# Patient Record
Sex: MALE | Race: WHITE | NOT HISPANIC OR LATINO | Employment: OTHER | URBAN - METROPOLITAN AREA
[De-identification: names, ages, dates, MRNs, and addresses within clinical notes are randomized per-mention and may not be internally consistent; named-entity substitution may affect disease eponyms.]

---

## 2018-04-09 ENCOUNTER — AMBULATORY - HEALTHEAST (OUTPATIENT)
Dept: UROLOGY | Facility: CLINIC | Age: 78
End: 2018-04-09

## 2018-04-09 ENCOUNTER — AMBULATORY - HEALTHEAST (OUTPATIENT)
Dept: LAB | Facility: HOSPITAL | Age: 78
End: 2018-04-09

## 2018-04-09 DIAGNOSIS — N20.1 CALCULUS OF URETER: ICD-10-CM

## 2018-04-10 LAB
1ST CONSTITUENT:: NORMAL
SOURCE: NORMAL

## 2018-04-19 ENCOUNTER — OFFICE VISIT - HEALTHEAST (OUTPATIENT)
Dept: UROLOGY | Facility: CLINIC | Age: 78
End: 2018-04-19

## 2018-04-19 DIAGNOSIS — N20.9 URINARY TRACT STONES: ICD-10-CM

## 2018-04-19 DIAGNOSIS — N21.0 CALCULUS OF BLADDER: ICD-10-CM

## 2018-04-19 DIAGNOSIS — N20.9 URINARY CALCULUS: ICD-10-CM

## 2018-10-01 ENCOUNTER — TRANSFERRED RECORDS (OUTPATIENT)
Dept: HEALTH INFORMATION MANAGEMENT | Facility: CLINIC | Age: 78
End: 2018-10-01

## 2018-10-03 ENCOUNTER — TELEPHONE (OUTPATIENT)
Dept: NUCLEAR MEDICINE | Facility: CLINIC | Age: 78
End: 2018-10-03

## 2018-10-03 NOTE — TELEPHONE ENCOUNTER
I spoke with Fabrice at [facility]  This patient's plan follows Medicare Guidelines if Medicare covers then [facility] covers the rest at 100% REF#[id number]    Upon checking on [employer] Medicare compliance  CPT 27217 with Med code   ICD-10-Parkinson disease (H) [G20  No policy exists-

## 2018-10-24 ENCOUNTER — RADIANT APPOINTMENT (OUTPATIENT)
Dept: NUCLEAR MEDICINE | Facility: CLINIC | Age: 78
End: 2018-10-24
Attending: PSYCHIATRY & NEUROLOGY
Payer: COMMERCIAL

## 2018-10-24 DIAGNOSIS — G20.A1 PARKINSON DISEASE (H): ICD-10-CM

## 2018-10-24 PROCEDURE — A9584 IODINE I-123 IOFLUPANE: HCPCS | Performed by: RADIOLOGY

## 2018-10-24 PROCEDURE — 78607 NM BRAIN IMAGING TOMOGRAPHIC (SPECT) DATSCAN: CPT | Performed by: RADIOLOGY

## 2019-10-03 ENCOUNTER — RECORDS - HEALTHEAST (OUTPATIENT)
Dept: ADMINISTRATIVE | Facility: OTHER | Age: 79
End: 2019-10-03

## 2019-10-11 ENCOUNTER — HOSPITAL ENCOUNTER (OUTPATIENT)
Dept: MRI IMAGING | Facility: HOSPITAL | Age: 79
Discharge: HOME | End: 2019-10-11
Attending: PSYCHIATRY & NEUROLOGY

## 2019-10-11 DIAGNOSIS — G20.A1 PARKINSON DISEASE (H): ICD-10-CM

## 2019-10-11 DIAGNOSIS — M72.2 PLANTAR FASCIITIS, RIGHT: ICD-10-CM

## 2020-03-12 ENCOUNTER — TRANSCRIBE ORDERS (OUTPATIENT)
Dept: OTHER | Age: 80
End: 2020-03-12

## 2020-03-12 DIAGNOSIS — G20.A1 PARKINSON DISEASE (H): Primary | ICD-10-CM

## 2020-03-24 ENCOUNTER — TELEPHONE (OUTPATIENT)
Dept: NEUROLOGY | Facility: CLINIC | Age: 80
End: 2020-03-24

## 2020-03-24 NOTE — TELEPHONE ENCOUNTER
Spoke to patient's wife Elham. Told her that we have to reschedule patient's new DEEP BRAIN STIMULATION visit with Dr. Washington and put it 4-6 weeks out. I told her that we are going day by day to see what we will need to do to keep our patients safe. She was understanding and will expect a call from a .

## 2020-04-22 NOTE — TELEPHONE ENCOUNTER
RECORDS RECEIVED FROM: Care Everywhere   Date of Appt: 4.23.20   NOTES (FOR ALL VISITS) STATUS DETAILS   OFFICE NOTE from referring provider  CE 3.12.20- Referral only from Dr. Bud Ste[facility]    10.1.18- OV- Dr. Bud Peres[facility]   OFFICE NOTE from other specialist CE 5.24.19- OV- Dr. Tammy Coe   DISCHARGE SUMMARY from hospital N/A    DISCHARGE REPORT from the ER N/A    OPERATIVE REPORT N/A    MEDICATION LIST CE    IMAGING  (FOR ALL VISITS)     EMG N/A    EEG N/A    MRI (HEAD, NECK, SPINE) N/A    LUMBAR PUNCTURE N/A    DONNY Scan Pacs 10.24.18- [facility] [place]   CT (HEAD, NECK, SPINE) N/A

## 2020-04-23 ENCOUNTER — PRE VISIT (OUTPATIENT)
Dept: NEUROLOGY | Facility: CLINIC | Age: 80
End: 2020-04-23

## 2020-04-23 ENCOUNTER — VIRTUAL VISIT (OUTPATIENT)
Dept: NEUROLOGY | Facility: CLINIC | Age: 80
End: 2020-04-23
Attending: PSYCHIATRY & NEUROLOGY
Payer: COMMERCIAL

## 2020-04-23 DIAGNOSIS — G20.A1 PARKINSON'S DISEASE (H): Primary | ICD-10-CM

## 2020-04-23 PROBLEM — C44.90 MALIGNANT NEOPLASM OF SKIN: Status: ACTIVE | Noted: 2017-08-03

## 2020-04-23 PROBLEM — E78.00 PURE HYPERCHOLESTEROLEMIA: Status: ACTIVE | Noted: 2020-04-23

## 2020-04-23 RX ORDER — CARBIDOPA AND LEVODOPA 50; 200 MG/1; MG/1
1 TABLET, EXTENDED RELEASE ORAL 4 TIMES DAILY
COMMUNITY
Start: 2018-11-28

## 2020-04-23 RX ORDER — LISINOPRIL 20 MG/1
20 TABLET ORAL EVERY MORNING
COMMUNITY
Start: 2020-04-13

## 2020-04-23 RX ORDER — SIMVASTATIN 40 MG
40 TABLET ORAL AT BEDTIME
COMMUNITY
Start: 2020-04-13

## 2020-04-23 RX ORDER — TRAZODONE HYDROCHLORIDE 50 MG/1
50 TABLET, FILM COATED ORAL
COMMUNITY
Start: 2020-03-11 | End: 2024-04-11

## 2020-04-23 ASSESSMENT — UNIFIED PARKINSONS DISEASE RATING SCALE (UPDRS)
HANDMOVEMENTS_RIGHT: MILD: ANY OF THE FOLLOWING: A) 3 TO 5 INTERRUPTIONS DURING TAPPING B) MILD SLOWING C) THE AMPLITUDE DECREMENTS MIDWAY IN THE 10-MOVEMENT SEQUENCE
ARISING_CHAIR: SLIGHT: ARISING IS SLOWER THAN NORMAL, OR MAY NEED MORE THAN ONE ATTEMPT, OR MAY NEED TO MOVE FORWARD IN THE CHAIR TO ARISE.  NO NEED TO USE THE ARMS OF THE CHAIR.
FINGER_TAPPING_RIGHT: MILD: ANY OF THE FOLLOWING: A) 3 TO 5 INTERRUPTIONS DURING TAPPING B) MILD SLOWING C) THE AMPLITUDE DECREMENTS MIDWAY IN THE 10-MOVEMENT SEQUENCE
FINGER_TAPPING_LEFT: SLIGHT: ANY OF THE FOLLOWING: A) THE REGULAR RHYTHM IS BROKEN WITH ONE WITH ONE OR TWO INTERRUPTIONS OR HESITATIONS OF THE MOVEMENT B) SLIGHT SLOWING C) THE AMPLITUDE DECREMENTS NEAR THE END OF THE 10 MOVEMENTS.
AMPLITUDE_LIP_JAW: NORMAL: NO TREMOR.
SPONTANEITY_OF_MOVEMENT: 0: NORMAL.  NO PROBLEMS.
PRONATION_SUPINATION_LEFT: SLIGHT: ANY OF THE FOLLOWING: A) THE REGULAR RHYTHM IS BROKEN WITH ONE WITH ONE OR TWO INTERRUPTIONS OR HESITATIONS OF THE MOVEMENT B) SLIGHT SLOWING C) THE AMPLITUDE DECREMENTS NEAR THE END OF THE 10 MOVEMENTS.
TOETAPPING_RIGHT: SLIGHT: ANY OF THE FOLLOWING: A) THE REGULAR RHYTHM IS BROKEN WITH ONE WITH ONE OR TWO INTERRUPTIONS OR HESITATIONS OF THE MOVEMENT B) SLIGHT SLOWING C) THE AMPLITUDE DECREMENTS NEAR THE END OF THE 10 MOVEMENTS.
LEG_AGILITY_LEFT: SLIGHT: ANY OF THE FOLLOWING: A) THE REGULAR RHYTHM IS BROKEN WITH ONE WITH ONE OR TWO INTERRUPTIONS OR HESITATIONS OF THE MOVEMENT B) SLIGHT SLOWING C) THE AMPLITUDE DECREMENTS NEAR THE END OF THE 10 MOVEMENTS.
PRONATION_SUPINATION_RIGHT: SLIGHT: ANY OF THE FOLLOWING: A) THE REGULAR RHYTHM IS BROKEN WITH ONE WITH ONE OR TWO INTERRUPTIONS OR HESITATIONS OF THE MOVEMENT B) SLIGHT SLOWING C) THE AMPLITUDE DECREMENTS NEAR THE END OF THE 10 MOVEMENTS.
AMPLITUDE_RUE: MILD > 1 CM BUT < 3 CM IN MAXIMAL AMPLITUDE.
FREEZING_GAIT: NORMAL
PARKINSONS_MEDS: ON
POSTURE: 1 SLIGHT.  NOT QUITE ERECT BUT COULD BE NORMAL FOR OLDER PERSONS.
TOETAPPING_LEFT: SLIGHT: ANY OF THE FOLLOWING: A) THE REGULAR RHYTHM IS BROKEN WITH ONE WITH ONE OR TWO INTERRUPTIONS OR HESITATIONS OF THE MOVEMENT B) SLIGHT SLOWING C) THE AMPLITUDE DECREMENTS NEAR THE END OF THE 10 MOVEMENTS.
AMPLITUDE_RLE: SLIGHT: < 1 CM IN MAXIMAL AMPLITUDE.
LEG_AGILITY_RIGHT: MILD: ANY OF THE FOLLOWING: A) 3 TO 5 INTERRUPTIONS DURING TAPPING B) MILD SLOWING C) THE AMPLITUDE DECREMENTS MIDWAY IN THE 10-MOVEMENT SEQUENCE
AMPLITUDE_LLE: NORMAL: NO TREMOR.
HANDMOVEMENTS_LEFT: SLIGHT: ANY OF THE FOLLOWING: A) THE REGULAR RHYTHM IS BROKEN WITH ONE WITH ONE OR TWO INTERRUPTIONS OR HESITATIONS OF THE MOVEMENT B) SLIGHT SLOWING C) THE AMPLITUDE DECREMENTS NEAR THE END OF THE 10 MOVEMENTS.
GAIT: NORMAL
AMPLITUDE_LUE: NORMAL: NO TREMOR.

## 2020-04-23 NOTE — PATIENT INSTRUCTIONS
Today you spoke to Horacio Hairston, and JEAN MARIE Barahona NP. We discuss Deep Brain Stimulation (DBS) as a treatment option for your Parkinson disease symptoms. We understand that you are mainly interested at this point in gathering more information about DBS.  We discussed the process of evaluation for Deep Brain Stimulation at the [facility], which will consist of a MRI of the brain, neuropsychometric evaluation, video On/Off testing and a meeting with one of our DBS neurosurgeons (Dr. Jason Jimenez or Dr. Ankit Bowers). We also discussed the option for attending the [facility] DBS education class. However, due to COVID-19, classes are currently suspended. Once the class opens back up, Mrs. Eveline Mejia RN, (our DBS nurse) will be contacting you to arrange the DBS class.     Plan:  - Arrange to take the DBS class at the [facility], when it becomes available.   - Consider going through the DBS workup process. There is no rush. If you do decide to start the DBS workup, please contact the clinic and Mrs. Eveline Mejia RN, will help with scheduling the workup.   - [employer] has a series of short videos to help Parkinson disease patients understand DBS Therapy. The videos combine engaging patient stories, animations, and education from clinicians.  The website address is: [email or web address]. This is not an endorsement for [employer].   - Please call the clinic for questions or concerns.     Follow up appointment in 4-5 months.

## 2020-04-23 NOTE — PROGRESS NOTES
MOVEMENT DISORDERS TELEMEDICINE VISIT:     PATIENT: Bernard Sanz    : 1940    DATE: 2020    Dear Colleague,     Thank you for referring your patient, Mr. Sanz, to the [facility] NEUROLOGY at [facility]. Please see a copy of my visit note below.     Referring Physician: Dr. Bud Steward [facility]    REASON FOR VISIT: New patient with Parkinson disease, interested in learning more about Deep Brain Stimulation (DBS).    After a review of the patient s situation, this visit was changed from an in-person visit to a Telemedicine visit to reduce the risk of COVID-19 exposure. The patient is being evaluated via a billable Telemedicine visit.    Mr. 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    History obtained from patient and accompanied by his wife. Initial symptoms began in right hand with tremors that he noticed both at rest and with action in  (75 years old). He was diagnosed by Dr. Steward in 2016. 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 Patient was initially treated with carbidopa/levodopa 25/100 mg 3 times a day with good initial treatment.  He was eventually switched to carbidopa/levodopa 50/200 mg 4 times a day due to residual tremors. Patient reports that the tremors in his right hand are worse and tremors have begun in his right leg.  Tremors in his hands make it difficult for him to pour liquid, drink coffee.  Patient reports that levodopa resolves his tremors about 90% at its peak but despite this he  "continues to have residual tremor on and off throughout the day. He shares that stress worsens his tremors. Levodopa takes about 15 minutes to start working and begins to wear off after about 5 hours. He denies side effects to levodopa including hallucinations, dyskinesias, nausea, vomiting.  The patient reports balance issues every once in a while, however, continues to golf with minimal impediment.  He denies falls and freezing of gait.  He denies rigidity and states that bradykinesia is not an issue for him.  He does report more trouble rolling in bed and getting up from a deep seated chair.  He reports his sense of smell is \"good\".  Patient takes trazodone 50 mg at bedtime to help with sleep.  He states that he has a hard time staying asleep.  He gets up once a night to urinate.      Parkinson's Disease Non-motor Symptom Review:  Psychiatric disturbances - He denies issues with mood or changes in behavior.  Patient describes one episode of depression after having a stroke in 1991.  He reports that after the stroke he was unable to work or perform usual activities.  He was never treated medically for depression.    Cognitive impairment -  No issues.   Sleep disturbances - Denies active dreaming.  Hallucinations - none   Speech - He reports slurred speech when he is tired.    Swallowing - No issues.  Salivation - No issues.    Patient suffered a stroke in 1991 and has residual paresthesias in left hand and left perioral area.  In 2013 patient was found to have kidney stones and underwent a procedure for prostate cancer.  Patient reports that during this timeframe, he was inactive and sedentary and developed a left leg DVT which progressed to a pulmonary emboli.  He was treated with Coumadin x6 months.    I have reviewed and updated the patient's Past Medical History, Social History, Family History and Medication List.    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    DJD tarsometatarsal joint right foot     DVT (deep venous thrombosis) (H) 10/2013    left leg with progression to pulmonary emboli     Erectile dysfunction      Hearing loss of both ears      HTN (hypertension)      Hypercholesteremia      Kidney stones 10/16/2013     Parkinson disease (H) 10/26/2015     Plantar fasciitis of right foot 10/2019     Prostate cancer (H) 10/16/2013     Pulmonary emboli (H) 2013     Past Surgical History:   Procedure Laterality Date     Musa's esophagus s/p ablation       CHOLECYSTECTOMY       Social History     Socioeconomic History     Marital status:      Spouse name: Not on file     Number of children: Not on file     Years of education: Not on file     Highest education level: Not on file   Occupational History     Occupation: retired   Social Needs     Financial resource strain: Not on file     Food insecurity     Worry: Not on file     Inability: Not on file     Transportation needs     Medical: Not on file     Non-medical: Not on file   Tobacco Use     Smoking status: Not on file   Substance and Sexual Activity     Alcohol use: Not on file     Drug use: Not on file     Sexual activity: Not on file   Lifestyle     Physical activity     Days per week: Not on file     Minutes per session: Not on file     Stress: Not on file   Relationships     Social connections     Talks on phone: Not on file     Gets together: Not on file     Attends [employer] service: Not on file     Active member of club or organization: Not on file     Attends meetings of clubs or organizations: Not on file     Relationship status: Not on file     Intimate partner violence     Fear of current or ex partner: Not on file     Emotionally abused: Not on file     Physically abused: Not on file     Forced sexual activity: Not on file   Other Topics Concern     Not on file   Social  History Narrative    Lives with wife in a home.   54 years.       Medications:  Current Outpatient Medications   Medication Sig Dispense Refill     carbidopa-levodopa (SINEMET CR)  MG CR tablet        lisinopril (ZESTRIL) 20 MG tablet Take 20 mg by mouth       omeprazole (PRILOSEC) 20 MG DR capsule TAKE 1 CAPSULE TWICE DAILY BEFORE MEALS       simvastatin (ZOCOR) 40 MG tablet Take 40 mg by mouth       traZODone (DESYREL) 50 MG tablet Take 50 mg by mouth          Movement Disorder Medications                   5 am 10 am 3 pm 8 pm                 CD/LD 50/200 mg 1 1 1 1   Last taken at 6 am    Allergies:  Tetracycline    Physical Exam:  GENERAL: alert, active, attentive, appropriately groomed   HEENT: normocephalic, eyes open with no discharge, nares patent  CHEST: non labored breathing   PSYCH: mood stable, jovial     Neurologic Exam:  MENTAL STATUS: Alert and oriented to person, place, time, and situation. Follows commands. Recent and remote memory intact. Attention span and concentration intact. Fund of knowledge intact to current events.   SPEECH: Fluent, intact comprehension and articulation  CN: visual fields intact, EOMIB,  no nystagmus, normal saccades, facial movement symmetric, some hearing loss though grossly intact to conversation, tongue protrudes midline   MOTOR: Moves all extremities equally against gravity without difficulty  Involuntary movements: refer to UDPRS III  COORDINATION: no dysmetria with FTN  GAIT: able to rise unassisted from a seated position, normal arm swing and normal stride length, no en bloc turns, no ataxia     UPDRS III  UPDRS Values 4/23/2020   Time: 8:33 AM   Medication On   Finger Taps R 2   Finger Taps L 1   Hand Mvt R 2   Hand Mvt L 1   Pron-/Supinate R 1   Pron-/Supinate L 1   Toe Tap R 1   Toe Tap L 1   Leg Agility R 2   Leg Agility L 1   Arise From Chair 1   Gait 0   Gait Freezing 0   Posture 1   Global Spont Mvt 0   Postural Tremor RUE 2   Postural Tremor LUE  0   Kinetic Tremor RUE 1   Kinetic Tremor LUE 0   Rest Tremor RUE 2   Rest Tremor LUE 0   Rest Tremor RLE 1   Rest Tremor LLE 0   Rest Tremor Lip/Jaw 0       Data Reviewed:   - 10/2018 DONNY scan 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 He was referred by Dr. Steward and is interested in learning more about Deep Brain Stimulation (DBS). He is interested in treating his tremors, which mainly affects his right body, right hand > right leg. He reports good symptomatic control with his current PD regimen of CD/LD 50/200 mg one tab qid. He states about 90% tremor control with this medication which begins to wear off after about 5 hours. He denies SEs to levodopa. We discussed DBS as a treatment option for his Parkinson disease symptoms and the process of evaluation for Deep Brain Stimulation at the [facility]. We also discussed the option for attending the [facility] DBS education class. However, due to COVID-19, classes are currently suspended. If he decides to pursue DBS workup, we think he would be a good candidate for DBS treatment.     Parkinson's disease (H)  (primary encounter diagnosis)    Plan:   - Arrange to take the DBS class at the [facility], when it becomes available.   - They will consider going through the DBS workup process. If they do decide to undergo the workup, they will contact the clinic and Mrs. Eveline Mejia RN, will help with scheduling the workup.   - Recommended they look at [employer] website for a series of short videos that help Parkinson disease patients understand DBS Therapy.   - Please call the clinic for questions or concerns.     Follow up in 4-5 months or sooner as needed.    Brielle Leonard, DO  Movement Disorders Fellow    I have reviewed the note as documented above.  This accurately  captures the substance of my conversation with the patient.    Time was a wise factor in today's visit, and greater than 50% of this 57 minute visit was spent discussing the therapeutic plan, counseling, and coordinating care.  20 minutes were spent in documentation review on April 23, 2020.    Phone call contact time  Call Started at 8:13 am  Call Ended at 9:10 am    Patient seen and plan of care discussed with Dr. Washington and JEAN MARIE Barahona NP, and were involved through the entirety of this TeleHealth visit.     The patient was seen by video visit with the fellow. I was present on video for the entire visit as we did the history and physical examination. I agree with the assessment and plan written by the fellow as noted above..    Jonas Washington MD, PhD

## 2020-04-23 NOTE — PROGRESS NOTES
"Bernard Sanz is a 80 year old male who is being evaluated via a billable video visit.      The patient has been notified of following:     \"This video visit will be conducted via a call between you and your physician/provider. We have found that certain health care needs can be provided without the need for an in-person physical exam.  This service lets us provide the care you need with a video conversation.  If a prescription is necessary we can send it directly to your pharmacy.  If lab work is needed we can place an order for that and you can then stop by our lab to have the test done at a later time.    Video visits are billed at different rates depending on your insurance coverage.  Please reach out to your insurance provider with any questions.    If during the course of the call the physician/provider feels a video visit is not appropriate, you will not be charged for this service.\"    Patient has given verbal consent for Video visit? Yes    How would you like to obtain your AVS? Mail a copy    Patient would like the video invitation sent by: [email or web address]    Will anyone else be joining your video visit? No        Video-Visit Details    Type of service:  Video Visit    Video Start Time: refer below   Video End Time: refer below     Originating Location (pt. Location): Home    Distant Location (provider location):  [facility] NEUROLOGY     Mode of Communication:  Video Conference via [employer] was attempted initially but patient's video was inoperable. We aborted and transitioned the video conference to a secured Zoom meeting.       Zaki Gerber, EMT        "

## 2020-09-02 ENCOUNTER — TELEPHONE (OUTPATIENT)
Dept: NEUROLOGY | Facility: CLINIC | Age: 80
End: 2020-09-02

## 2020-09-02 NOTE — TELEPHONE ENCOUNTER
[facility] Call Center    Phone Message    May a detailed message be left on voicemail: yes     Reason for Call: Other: Patient's wife is calling in regards to the training for parkinsons DBS class. Patient is wondering if class has became available yet.     Please advise     Action Taken: Other:  NEUROLOGY    Travel Screening: Not Applicable

## 2020-09-08 NOTE — TELEPHONE ENCOUNTER
Spoke with pt's spouse. Let her know that we are not holding our deep brain stimulation class in person due to covid-19. We are trying to get our class online and I will reach out once the online class is available. I have added pt to my DBS class list. Pt's spouse has my direct number and is welcome to call with any questions.

## 2020-11-14 ENCOUNTER — HEALTH MAINTENANCE LETTER (OUTPATIENT)
Age: 80
End: 2020-11-14

## 2020-12-14 ENCOUNTER — TELEPHONE (OUTPATIENT)
Dept: NEUROLOGY | Facility: CLINIC | Age: 80
End: 2020-12-14

## 2020-12-14 NOTE — TELEPHONE ENCOUNTER
----- Message from Becky Nassar RN sent at 12/13/2020  4:25 PM CST -----  Regarding: Call patient re DBS  Please call patient and see where he is with his interest in Deep Brain Stimulation work up.  He was waiting for the Deep Brain Stimulation class to be on-line but you can direct him to our Deep Brain Stimulation handbook until the class is on-line (which still might be awhile).    [email or web address]    Start a patient listing and roster in the database if he is interested.    Thank you,  Becky

## 2020-12-14 NOTE — TELEPHONE ENCOUNTER
Spoke to patient's wife, Elham. Upon asking for the patient, the wife stated the patient is no longer interested in doing the surgery since his insurance has changed.

## 2020-12-17 ENCOUNTER — COMMUNICATION - HEALTHEAST (OUTPATIENT)
Dept: SCHEDULING | Facility: CLINIC | Age: 80
End: 2020-12-17

## 2020-12-18 ENCOUNTER — HOME CARE/HOSPICE - HEALTHEAST (OUTPATIENT)
Dept: HOME HEALTH SERVICES | Facility: HOME HEALTH | Age: 80
End: 2020-12-18

## 2021-06-17 NOTE — PROGRESS NOTES
New patient to [facility].  Patient passed a stone.  Patient will drop off stone to [facility] outpatient lab for analysis testing.  Patient is scheduled for consultation appointment at [facility] on 4/16/2018.

## 2021-06-17 NOTE — PROGRESS NOTES
Assessment/Plan:        Diagnoses and all orders for this visit:    Calculus of bladder  -     Patient Stated Goal: Prevent further stones  -     Calcium Oxalate Stone Prevention Education    Urinary tract stones  -     Urinalysis Macroscopic    Urinary calculus    Other orders  -     carbidopa-levodopa (SINEMET)  mg per tablet;   -     aspirin 81 MG EC tablet; Take 81 mg by mouth.  -     lisinopril (PRINIVIL,ZESTRIL) 20 MG tablet; Take 20 mg by mouth.  -     omeprazole (PRILOSEC) 20 MG capsule; Take by mouth.  -     simvastatin (ZOCOR) 40 MG tablet; Take 40 mg by mouth.      Stone Management Plan  [facility] Stone Management 4/19/2018   Urinary Tract Infection No suspicion of infection   Renal Colic Asymptomatic at this time   Renal Failure No suspicion of renal failure   Current CT date 4/7/2018   Right sided stones? Yes   R Number of ureteral stones No ureteral stones   R Hydronephrosis Mild   R Stone Event New stone passed prior to visit   Left sided stones? No   L Stone Event No current event         Subjective:      HPI  Mr. Bernard Sanz is a 78 y.o.  male presenting to the [facility] [place] following ED visit for urolithiasis.     He is a first time calcium oxalate stone former who has not required stone clearance procedures. He has not previously participated in stone risk evaluation. He has no identified modifiable stone risk factors. He has no identified non-modifiable stone risk factors.    Primary symptom occurring ~ 2 week ago was acute onset right flank pain. The pain was constant, sharp and radiated into the right abdomen. It was 9/10 at its worst with no associated alleviating or aggravating factors. No similar pain events in the past. Significant associated symptoms at presentation included:  nausea and vomiting.     He passed and retrieved a stone the following day and is here to discuss results. He is asymptomatic at present. He denies symptoms of fever, chills, flank pain,  nausea, vomiting, urinary frequency and dysuria.     CT scan is personally reviewed and demonstrates a moderately obstructing 2 mm right UVJ calculus which has passed in the interval.    Significant labs from presentation include mild hematuria, no pyuria, negative nitrite, no bacteria, elevated WBC, normal C reactive protein, slightly elevated creatinine and normal potassium.    Stone analysis from 4/8/18 is 100 % calcium oxalate.    PLAN    79 yo M first time calcium oxalate stone former with interval passage of right ureteral calculus, specimen retrieved. No current stone burden.    Based on review of findings with the patient, he will follow up on a prn basis. Conservative stone prevention measures reviewed with patient. Patient verbalized understanding. Patient agrees with plan as discussed.     Patient also seen and examined by KAY Theodore   Review of Systems  A 12 point comprehensive review of systems is negative except for HPI    Past Medical History:   Diagnosis Date     Cancer     prostate     Deep vein thrombosis      GERD (gastroesophageal reflux disease)      High cholesterol     Per pt conversation     Hypertension      Kidney stone      Parkinson's disease      Past Surgical History:   Procedure Laterality Date     APPENDECTOMY      Per pt conversation     CHOLECYSTECTOMY      Per pt conversation     PROSTATECTOMY      Per pt conversation     TONSILLECTOMY      Per pt conversation     Current Outpatient Prescriptions   Medication Sig Dispense Refill     aspirin 81 MG EC tablet Take 81 mg by mouth.       carbidopa-levodopa (SINEMET)  mg per tablet        dimenhyDRINATE (DRAMAMINE) 50 MG tablet Take 1 tablet (50 mg total) by mouth 3 (three) times a day as needed. 12 tablet 0     ibuprofen (ADVIL,MOTRIN) 200 MG tablet Take 2 tablets (400 mg total) by mouth every 6 (six) hours as needed for pain. 30 tablet 0     lisinopril (PRINIVIL,ZESTRIL) 20 MG tablet Take 20 mg by mouth.        omeprazole (PRILOSEC) 20 MG capsule Take by mouth.       simvastatin (ZOCOR) 40 MG tablet Take 40 mg by mouth.       ondansetron (ZOFRAN ODT) 4 MG disintegrating tablet Take 1 tablet (4 mg total) by mouth every 8 (eight) hours as needed for nausea. 12 tablet 0     oxyCODONE-acetaminophen (PERCOCET) 5-325 mg per tablet Take 1 tablet by mouth every 6 (six) hours as needed for pain. 12 tablet 0     No current facility-administered medications for this visit.        Allergies   Allergen Reactions     Tetracyclines        Social History     Social History     Marital status:      Spouse name: N/A     Number of children: N/A     Years of education: N/A     Occupational History     Retired      Social History Main Topics     Smoking status: Former Smoker     Smokeless tobacco: Never Used     Alcohol use Yes      Comment: 3x per week     Drug use: Not on file     Sexual activity: Not on file     Other Topics Concern     Not on file     Social History Narrative       Family History   Problem Relation Age of Onset     Cancer Father      throat     Diabetes Maternal Grandfather      Urolithiasis Neg Hx      Clotting disorder Neg Hx      Gout Neg Hx      Heart disease Neg Hx        Objective:      Physical Exam  Vitals:    04/19/18 0839   BP: 173/89   Pulse: 82   Temp: 98.1  F (36.7  C)     General - well developed, well nourished, appropriate for age. Appears no distress at this time   Heart - regular rate and rhythm, no murmur  Respiratory - normal effort, clear to auscultation, good air entry without adventitious noises  Abdomen - slender soft, non-tender, no hepatosplenomegaly, no masses.   - no flank tenderness, no suprapubic tenderness, kidney and bladder non-palpable  MSK - normal spinal curvature. no spinal tenderness. normal gait. muscular strength intact.  Neurology - cranial nerves II-XII grossly intact, normal sensation, no unsteadiness  Skin - intact, no bruising, no gouty tophi  Psych - oriented to time,  place, and person, normal mood and affect.    Labs  Urinalysis POC (Office):  Nitrite, UA   Date Value Ref Range Status   04/07/2018 Negative Negative Final       Lab Urinalysis:  Blood, UA   Date Value Ref Range Status   04/07/2018 Small (!) Negative Final     Nitrite, UA   Date Value Ref Range Status   04/07/2018 Negative Negative Final     Leukocytes, UA   Date Value Ref Range Status   04/07/2018 Negative Negative Final     pH, UA   Date Value Ref Range Status   04/07/2018 5.5 4.5 - 8.0 Final    and Acute Labs   CBC   WBC   Date Value Ref Range Status   04/07/2018 18.3 (H) 4.0 - 11.0 thou/uL Final   11/03/2013 8.1 4.0 - 11.0 thou/uL Final   10/29/2013 12.6 (H) 4.0 - 11.0 thou/uL Final     Hemoglobin   Date Value Ref Range Status   04/07/2018 15.9 14.0 - 18.0 g/dL Final   11/03/2013 12.0 (L) 14.0 - 18.0 g/dL Final   11/02/2013 11.2 (L) 14.0 - 18.0 g/dL Final     Platelets   Date Value Ref Range Status   04/07/2018 268 140 - 440 thou/uL Final   11/03/2013 336 140 - 440 thou/uL Final   11/02/2013 263 140 - 440 thou/uL Final    and Renal Panel  [facility]  Creatinine   Date Value Ref Range Status   04/07/2018 1.49 (H) 0.70 - 1.30 mg/dL Final   11/03/2013 0.78 0.70 - 1.30 mg/dL Final   11/01/2013 0.78 0.70 - 1.30 mg/dL Final     Potassium   Date Value Ref Range Status   04/07/2018 4.3 3.5 - 5.0 mmol/L Final   11/03/2013 4.1 3.5 - 5.0 mmol/L Final   11/01/2013 4.2 3.5 - 5.0 mmol/L Final     Calcium   Date Value Ref Range Status   04/07/2018 9.5 8.5 - 10.5 mg/dL Final   11/03/2013 9.3 8.5 - 10.5 mg/dL Final   11/01/2013 9.1 8.5 - 10.5 mg/dL Final

## 2021-09-12 ENCOUNTER — HEALTH MAINTENANCE LETTER (OUTPATIENT)
Age: 81
End: 2021-09-12

## 2022-01-02 ENCOUNTER — HEALTH MAINTENANCE LETTER (OUTPATIENT)
Age: 82
End: 2022-01-02

## 2022-10-30 ENCOUNTER — HEALTH MAINTENANCE LETTER (OUTPATIENT)
Age: 82
End: 2022-10-30

## 2023-04-08 ENCOUNTER — HEALTH MAINTENANCE LETTER (OUTPATIENT)
Age: 83
End: 2023-04-08

## 2024-04-11 ENCOUNTER — APPOINTMENT (OUTPATIENT)
Dept: RADIOLOGY | Facility: HOSPITAL | Age: 84
DRG: 200 | End: 2024-04-11
Attending: STUDENT IN AN ORGANIZED HEALTH CARE EDUCATION/TRAINING PROGRAM
Payer: COMMERCIAL

## 2024-04-11 ENCOUNTER — HOSPITAL ENCOUNTER (INPATIENT)
Facility: HOSPITAL | Age: 84
LOS: 1 days | Discharge: HOME | DRG: 200 | End: 2024-04-13
Attending: EMERGENCY MEDICINE | Admitting: INTERNAL MEDICINE
Payer: COMMERCIAL

## 2024-04-11 ENCOUNTER — APPOINTMENT (OUTPATIENT)
Dept: CT IMAGING | Facility: HOSPITAL | Age: 84
DRG: 200 | End: 2024-04-11
Attending: EMERGENCY MEDICINE
Payer: COMMERCIAL

## 2024-04-11 DIAGNOSIS — J20.9 ACUTE BRONCHITIS, UNSPECIFIED ORGANISM: Primary | ICD-10-CM

## 2024-04-11 DIAGNOSIS — J93.9 PNEUMOTHORAX ON LEFT: ICD-10-CM

## 2024-04-11 DIAGNOSIS — R07.9 ACUTE CHEST PAIN: ICD-10-CM

## 2024-04-11 LAB
ANION GAP SERPL CALCULATED.3IONS-SCNC: 12 MMOL/L (ref 7–15)
BASOPHILS # BLD AUTO: 0 10E3/UL (ref 0–0.2)
BASOPHILS NFR BLD AUTO: 0 %
BUN SERPL-MCNC: 15.9 MG/DL (ref 8–23)
CALCIUM SERPL-MCNC: 9.8 MG/DL (ref 8.8–10.2)
CHLORIDE SERPL-SCNC: 104 MMOL/L (ref 98–107)
CREAT SERPL-MCNC: 0.91 MG/DL (ref 0.67–1.17)
DEPRECATED HCO3 PLAS-SCNC: 29 MMOL/L (ref 22–29)
EGFRCR SERPLBLD CKD-EPI 2021: 84 ML/MIN/1.73M2
EOSINOPHIL # BLD AUTO: 0.1 10E3/UL (ref 0–0.7)
EOSINOPHIL NFR BLD AUTO: 1 %
ERYTHROCYTE [DISTWIDTH] IN BLOOD BY AUTOMATED COUNT: 13.7 % (ref 10–15)
GLUCOSE SERPL-MCNC: 144 MG/DL (ref 70–99)
HCT VFR BLD AUTO: 45.2 % (ref 40–53)
HGB BLD-MCNC: 14.2 G/DL (ref 13.3–17.7)
HOLD SPECIMEN: NORMAL
HOLD SPECIMEN: NORMAL
IMM GRANULOCYTES # BLD: 0 10E3/UL
IMM GRANULOCYTES NFR BLD: 0 %
LYMPHOCYTES # BLD AUTO: 1.6 10E3/UL (ref 0.8–5.3)
LYMPHOCYTES NFR BLD AUTO: 20 %
MCH RBC QN AUTO: 31.7 PG (ref 26.5–33)
MCHC RBC AUTO-ENTMCNC: 31.4 G/DL (ref 31.5–36.5)
MCV RBC AUTO: 101 FL (ref 78–100)
MONOCYTES # BLD AUTO: 0.9 10E3/UL (ref 0–1.3)
MONOCYTES NFR BLD AUTO: 11 %
NEUTROPHILS # BLD AUTO: 5.5 10E3/UL (ref 1.6–8.3)
NEUTROPHILS NFR BLD AUTO: 68 %
NRBC # BLD AUTO: 0 10E3/UL
NRBC BLD AUTO-RTO: 0 /100
NT-PROBNP SERPL-MCNC: 161 PG/ML (ref 0–1800)
PLATELET # BLD AUTO: 245 10E3/UL (ref 150–450)
POTASSIUM SERPL-SCNC: 4.8 MMOL/L (ref 3.4–5.3)
RBC # BLD AUTO: 4.48 10E6/UL (ref 4.4–5.9)
SODIUM SERPL-SCNC: 145 MMOL/L (ref 135–145)
TROPONIN T SERPL HS-MCNC: 17 NG/L
TROPONIN T SERPL HS-MCNC: 18 NG/L
WBC # BLD AUTO: 8.2 10E3/UL (ref 4–11)

## 2024-04-11 PROCEDURE — 250N000013 HC RX MED GY IP 250 OP 250 PS 637: Performed by: EMERGENCY MEDICINE

## 2024-04-11 PROCEDURE — 250N000011 HC RX IP 250 OP 636: Performed by: EMERGENCY MEDICINE

## 2024-04-11 PROCEDURE — 71275 CT ANGIOGRAPHY CHEST: CPT

## 2024-04-11 PROCEDURE — G0378 HOSPITAL OBSERVATION PER HR: HCPCS

## 2024-04-11 PROCEDURE — 36415 COLL VENOUS BLD VENIPUNCTURE: CPT | Performed by: STUDENT IN AN ORGANIZED HEALTH CARE EDUCATION/TRAINING PROGRAM

## 2024-04-11 PROCEDURE — 250N000013 HC RX MED GY IP 250 OP 250 PS 637: Performed by: INTERNAL MEDICINE

## 2024-04-11 PROCEDURE — 84484 ASSAY OF TROPONIN QUANT: CPT | Performed by: EMERGENCY MEDICINE

## 2024-04-11 PROCEDURE — 83880 ASSAY OF NATRIURETIC PEPTIDE: CPT | Performed by: EMERGENCY MEDICINE

## 2024-04-11 PROCEDURE — 93005 ELECTROCARDIOGRAM TRACING: CPT | Performed by: STUDENT IN AN ORGANIZED HEALTH CARE EDUCATION/TRAINING PROGRAM

## 2024-04-11 PROCEDURE — 99222 1ST HOSP IP/OBS MODERATE 55: CPT | Performed by: INTERNAL MEDICINE

## 2024-04-11 PROCEDURE — 80048 BASIC METABOLIC PNL TOTAL CA: CPT | Performed by: EMERGENCY MEDICINE

## 2024-04-11 PROCEDURE — 93005 ELECTROCARDIOGRAM TRACING: CPT | Performed by: EMERGENCY MEDICINE

## 2024-04-11 PROCEDURE — 85025 COMPLETE CBC W/AUTO DIFF WBC: CPT | Performed by: EMERGENCY MEDICINE

## 2024-04-11 PROCEDURE — 36415 COLL VENOUS BLD VENIPUNCTURE: CPT | Performed by: EMERGENCY MEDICINE

## 2024-04-11 PROCEDURE — 71046 X-RAY EXAM CHEST 2 VIEWS: CPT

## 2024-04-11 PROCEDURE — 99285 EMERGENCY DEPT VISIT HI MDM: CPT | Mod: 25

## 2024-04-11 RX ORDER — SIMVASTATIN 10 MG
40 TABLET ORAL AT BEDTIME
Status: DISCONTINUED | OUTPATIENT
Start: 2024-04-11 | End: 2024-04-13 | Stop reason: HOSPADM

## 2024-04-11 RX ORDER — CARBIDOPA AND LEVODOPA 25; 100 MG/1; MG/1
1 TABLET ORAL 4 TIMES DAILY
COMMUNITY

## 2024-04-11 RX ORDER — CARBIDOPA AND LEVODOPA 25; 100 MG/1; MG/1
1 TABLET ORAL 4 TIMES DAILY
Status: DISCONTINUED | OUTPATIENT
Start: 2024-04-12 | End: 2024-04-13 | Stop reason: HOSPADM

## 2024-04-11 RX ORDER — ASPIRIN 81 MG/1
324 TABLET, CHEWABLE ORAL ONCE
Status: DISCONTINUED | OUTPATIENT
Start: 2024-04-11 | End: 2024-04-11

## 2024-04-11 RX ORDER — ACETAMINOPHEN 325 MG/1
650 TABLET ORAL EVERY 4 HOURS PRN
Status: DISCONTINUED | OUTPATIENT
Start: 2024-04-11 | End: 2024-04-13 | Stop reason: HOSPADM

## 2024-04-11 RX ORDER — CARBIDOPA AND LEVODOPA 50; 200 MG/1; MG/1
1 TABLET, EXTENDED RELEASE ORAL 4 TIMES DAILY
Status: DISCONTINUED | OUTPATIENT
Start: 2024-04-12 | End: 2024-04-13 | Stop reason: HOSPADM

## 2024-04-11 RX ORDER — LISINOPRIL 20 MG/1
20 TABLET ORAL DAILY
Status: DISCONTINUED | OUTPATIENT
Start: 2024-04-12 | End: 2024-04-13 | Stop reason: HOSPADM

## 2024-04-11 RX ORDER — ACETAMINOPHEN 650 MG/1
650 SUPPOSITORY RECTAL EVERY 4 HOURS PRN
Status: DISCONTINUED | OUTPATIENT
Start: 2024-04-11 | End: 2024-04-13 | Stop reason: HOSPADM

## 2024-04-11 RX ORDER — CALCIUM CARBONATE 500 MG/1
1000 TABLET, CHEWABLE ORAL 4 TIMES DAILY PRN
Status: DISCONTINUED | OUTPATIENT
Start: 2024-04-11 | End: 2024-04-13 | Stop reason: HOSPADM

## 2024-04-11 RX ORDER — IOPAMIDOL 755 MG/ML
90 INJECTION, SOLUTION INTRAVASCULAR ONCE
Status: COMPLETED | OUTPATIENT
Start: 2024-04-11 | End: 2024-04-11

## 2024-04-11 RX ORDER — GABAPENTIN 100 MG/1
200 CAPSULE ORAL 3 TIMES DAILY
Status: DISCONTINUED | OUTPATIENT
Start: 2024-04-11 | End: 2024-04-13 | Stop reason: HOSPADM

## 2024-04-11 RX ORDER — AMOXICILLIN 250 MG
1 CAPSULE ORAL 2 TIMES DAILY PRN
Status: DISCONTINUED | OUTPATIENT
Start: 2024-04-11 | End: 2024-04-13 | Stop reason: HOSPADM

## 2024-04-11 RX ORDER — PANTOPRAZOLE SODIUM 40 MG/1
40 TABLET, DELAYED RELEASE ORAL
Status: DISCONTINUED | OUTPATIENT
Start: 2024-04-11 | End: 2024-04-13 | Stop reason: HOSPADM

## 2024-04-11 RX ORDER — GABAPENTIN 100 MG/1
100 CAPSULE ORAL DAILY PRN
COMMUNITY

## 2024-04-11 RX ORDER — LIDOCAINE 4 G/G
1 PATCH TOPICAL EVERY 24 HOURS
COMMUNITY
End: 2024-09-25

## 2024-04-11 RX ORDER — LIDOCAINE 4 G/G
1 PATCH TOPICAL ONCE
Status: COMPLETED | OUTPATIENT
Start: 2024-04-11 | End: 2024-04-12

## 2024-04-11 RX ORDER — AMOXICILLIN 250 MG
2 CAPSULE ORAL 2 TIMES DAILY PRN
Status: DISCONTINUED | OUTPATIENT
Start: 2024-04-11 | End: 2024-04-13 | Stop reason: HOSPADM

## 2024-04-11 RX ADMIN — LIDOCAINE 1 PATCH: 4 PATCH TOPICAL at 17:56

## 2024-04-11 RX ADMIN — GABAPENTIN 200 MG: 100 CAPSULE ORAL at 23:09

## 2024-04-11 RX ADMIN — IOPAMIDOL 90 ML: 755 INJECTION, SOLUTION INTRAVENOUS at 17:49

## 2024-04-11 RX ADMIN — PANTOPRAZOLE SODIUM 40 MG: 20 TABLET, DELAYED RELEASE ORAL at 23:09

## 2024-04-11 RX ADMIN — SIMVASTATIN 40 MG: 40 TABLET, FILM COATED ORAL at 23:09

## 2024-04-11 ASSESSMENT — COLUMBIA-SUICIDE SEVERITY RATING SCALE - C-SSRS
6. HAVE YOU EVER DONE ANYTHING, STARTED TO DO ANYTHING, OR PREPARED TO DO ANYTHING TO END YOUR LIFE?: NO
1. IN THE PAST MONTH, HAVE YOU WISHED YOU WERE DEAD OR WISHED YOU COULD GO TO SLEEP AND NOT WAKE UP?: NO
2. HAVE YOU ACTUALLY HAD ANY THOUGHTS OF KILLING YOURSELF IN THE PAST MONTH?: NO

## 2024-04-11 ASSESSMENT — ENCOUNTER SYMPTOMS
FEVER: 0
WOUND: 0
DIARRHEA: 0
NAUSEA: 0
COLOR CHANGE: 0
VOMITING: 0
COUGH: 0
ABDOMINAL PAIN: 0
SHORTNESS OF BREATH: 1

## 2024-04-11 ASSESSMENT — ACTIVITIES OF DAILY LIVING (ADL)
ADLS_ACUITY_SCORE: 35
ADLS_ACUITY_SCORE: 33

## 2024-04-11 NOTE — ED PROVIDER NOTES
EMERGENCY DEPARTMENT ENCOUNTER      NAME: Bernard Sanz  AGE: 84 year old male  YOB: 1940  MRN: [id number]  EVALUATION DATE & TIME: No admission date for patient encounter.    PCP: Tammy Boyle    ED PROVIDER: Lani Holland M.D.        Chief Complaint   Patient presents with    Chest Pain    Shortness of Breath         FINAL IMPRESSION:    1. Pneumothorax on left    2. Acute chest pain        MEDICAL DECISION MAKING:    Bernard Sanz is a 84 year old male with history of DVT and PE, CVA, hypertension, Parkinson's disease, hypercholesterolemia, former smoker  (quit 40 years ago) who presents to the ER with complaints of left-sided chest pain.     Troponin stable.  Ultimately found to have a 20-25% spontaneous pneumothorax in the left lung.  Satting appropriately at % on room air.  No significant tachypnea.  Able to speak full sentences easily.  He states he started having pain and feeling a little short of breath yesterday when he rolled over in bed.  No obvious rib fractures.  Radiologist on CT imaging.  Case was discussed with pulmonary and the plan is to do oxygen therapy on a nonrebreather mask with 100% FiO2.  Patient agrees with plan.  He was excepted to the hospital by the hospitalist.  He is not requiring ICU level care at this time.  Will hold off on any chest tube given the fact that he is likely 24 hours out from the initial onset of this and satting fine and appearing quite comfortable.      ED COURSE:  5:10 PM  I met with the patient to gather history and perform my exam. ED course and treatment discussed.    6:04 PM  Spoke with Radiologist who reports CT PE No PE but left PTX. No rib fractures. 20-25%.     6:19 PM  Patient was placed on a nonrebreather mask by nursing.  He is having no real symptoms at rest.  Oxygen saturations are 99% on room air.  Will speak with pulmonary since this seems to be spontaneous.  He states he is a remote smoker but quit many many years ago.   No rib fractures were seen by the radiologist.    6:42 PM  Spoke with Dr. Pace pulmonary.  Since his symptoms began yesterday and no known trauma or rib fractures we will try with oxygen therapy using 100% FiO2 with a nonrebreather mask or OxyMask.  No emergent chest tube at this time.  In the hospital we can redo a chest x-ray in the morning to see if there is been any interval improvement in expansion.  I updated patient and family at bedside on this plan and they agree.  Patient is in agreement with this plan.  He declines anything for pain at this time.  He is not having any increased work of breathing and is tolerating the oxygen mask at this time.    6:55 PM  Patient has been accepted to the hospital by the hospitalist.  He is requesting medical telemetry to observation status.  He is requested that I place an inpatient pulmonary consult and this has been placed.    I do not think that this represents rib fractures, allergic reaction, COPD exacerbation, asthma exacerbation, pneumonia, CHF, myocarditis, pericarditis, endocarditis, ACS, PE, ruptured AAA, aortic dissection, bowel obstruction, or other such etiologies at this time.    At the conclusion of the encounter I discussed the results of all of the tests and the disposition. Their questions were answered. The patient (and any family present) acknowledged understanding and were agreeable with the care plan.    CONSULTANTS:  Hospitalist - Dr. Gondal  Radiology - Dr. Manuel  Pulmonary - Dr. Pace      MEDICATIONS GIVEN IN THE EMERGENCY:  Medications   Lidocaine (LIDOCARE) 4 % Patch 1 patch (1 patch Transdermal $Patch/Med Applied 4/11/24 1093)   iopamidol (ISOVUE-370) solution 90 mL (90 mLs Intravenous $Given 4/11/24 4951)       NEW PRESCRIPTIONS STARTED AT TODAY'S ER VISIT     Medication List      There are no discharge medications for this visit.             CONDITION:  stable        DISPOSITION:  Med tele obs as accepted by Dr. Pace, hospitalist          =================================================================  =================================================================  TRIAGE ASSESSMENT:  Pt reports sharp non radiating chest pain since 0200 yesterday. Pt reports its intermittent. Also endorses SOB.      Triage Assessment (Adult)       Row Name 04/11/24 1606          Triage Assessment    Airway WDL WDL        Respiratory WDL    Respiratory WDL X;rhythm/pattern     Rhythm/Pattern, Respiratory shortness of breath;tachypneic        Skin Circulation/Temperature WDL    Skin Circulation/Temperature WDL WDL        Cardiac WDL    Cardiac WDL X;chest pain        Chest Pain Assessment    Chest Pain Location anterior chest, left     Character sharp        Peripheral/Neurovascular WDL    Peripheral Neurovascular WDL WDL        Cognitive/Neuro/Behavioral WDL    Cognitive/Neuro/Behavioral WDL WDL                        ED Triage Vitals [04/11/24 1609]   Enc Vitals Group      BP (!) 189/90      Pulse 78      Resp 16      Temp 97.9  F (36.6  C)      Temp src Temporal      SpO2 98 %      Weight 88.4 kg (194 lb 12.8 oz)          ================================================================  ================================================================    HPI    Patient information was obtained from: patient and family    Use of Intrepreter: N/A     Bernard Sanz is a 84 year old male with history of DVT and PE, CVA, hypertension, Parkinson's disease, hypercholesterolemia, who presents to the ER with complaints of left-sided chest pain.    Yesterday he states he rolled over in bed and developed onset of shortness of breath.  He also developed left-sided chest pain at that time.  He states the chest pain is worse with leaning forward, lying back, coughing, and even palpation to the left chest.  He has tried Tylenol with last dose around 2 or 3 PM.  He has been taking his usual gabapentin as well.    He is on Xarelto for history of PE and DVT and notes that he  did miss a dose a few days ago.    Otherwise denies any fevers, cough, abdominal pain, vomiting or diarrhea.  He states that his shortness of breath right now is best described as pain with deep breathing and less so just shortness of breath.    CHART REVIEW:  none      REVIEW OF SYSTEMS  Review of Systems   Constitutional:  Negative for fever.   Respiratory:  Positive for shortness of breath. Negative for cough.    Cardiovascular:  Positive for chest pain.   Gastrointestinal:  Negative for abdominal pain, diarrhea, nausea and vomiting.   Skin:  Negative for color change, rash and wound.   All other systems reviewed and are negative.        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    DJD tarsometatarsal joint right foot    DVT (deep venous thrombosis) (H) 10/2013    left leg with progression to pulmonary emboli    Erectile dysfunction     GERD (gastroesophageal reflux disease)     Hearing loss of both ears     High cholesterol     Per pt conversation    HTN (hypertension)     Hypercholesteremia     Hypertension     Kidney stone     Kidney stones 10/16/2013    Parkinson disease (H) 10/26/2015    Parkinson's disease (H)     Plantar fasciitis of right foot 10/2019    Prostate cancer (H) 10/16/2013    Pulmonary emboli (H) 2013         PAST SURGICAL HISTORY:  Past Surgical History:   Procedure Laterality Date    APPENDECTOMY      Per pt conversation    Musa's esophagus s/p ablation      CHOLECYSTECTOMY      CHOLECYSTECTOMY      Per pt conversation    IR PULMONARY ANGIOGRAM BILATERAL  12/15/2020    IR PULMONARY ANGIOGRAM BILATERAL  12/15/2020    PROSTATECTOMY      Per pt conversation    TONSILLECTOMY      Per pt conversation         CURRENT MEDICATIONS:    Prior to  Admission medications    Medication Sig Start Date End Date Taking? Authorizing Provider   carbidopa-levodopa (SINEMET CR)  MG CR tablet  11/28/18   Reported, Patient   lisinopril (ZESTRIL) 20 MG tablet Take 20 mg by mouth 4/13/20   Reported, Patient   omeprazole (PRILOSEC) 20 MG DR capsule TAKE 1 CAPSULE TWICE DAILY BEFORE MEALS 4/13/20   Reported, Patient   simvastatin (ZOCOR) 40 MG tablet Take 40 mg by mouth 4/13/20   Reported, Patient   traZODone (DESYREL) 50 MG tablet Take 50 mg by mouth 3/11/20   Reported, Patient         ALLERGIES:  Allergies   Allergen Reactions    Tetracycline          FAMILY HISTORY:  Family History   Problem Relation Age of Onset    Cancer Father         throat    Diabetes Maternal Grandfather     Urolithiasis No family hx of     Clotting Disorder No family hx of     Gout No family hx of     Heart Disease No family hx of          SOCIAL HISTORY:  Social History     Socioeconomic History    Marital status:    Occupational History    Occupation: retired   Tobacco Use    Smoking status: Former    Smokeless tobacco: Never   Substance and Sexual Activity    Alcohol use: Yes     Comment: Alcoholic Drinks/day: 3x per week   Social History Narrative    Lives with wife in a home.   54 years.     Social Determinants of Health     Financial Resource Strain: Low Risk  (10/26/2023)    Received from [facility]    Financial Resource Strain     Difficulty of Paying Living Expenses: 3   Food Insecurity: No Food Insecurity (10/26/2023)    Received from [facility]    Food Insecurity     Worried About Running Out of Food in the Last Year: 1   Transportation Needs: No Transportation Needs (10/26/2023)    [facility]    Transportation Needs     Lack of Transportation (Medical): 1   Social Connections: Socially Integrated (10/26/2023)    Received from [facility]    Social Connections     Frequency of Communication with Friends and Family: 0   Housing Stability: Low Risk  (10/26/2023)    [facility]    Housing Stability     Unable to Pay for Housing in the Last Year: 1         VITALS:  Patient Vitals for the past 24 hrs:   BP Temp Temp src Pulse Resp SpO2 Weight   04/11/24 1830 (!) 165/95 -- -- 87 (!) 32 95 % --   04/11/24 1800 (!) 169/96 -- -- 88 (!) 34 96 % --   04/11/24 1730 (!) 149/85 -- -- 84 (!) 34 95 % --   04/11/24 1700 (!) 150/86 -- -- 86 22 96 % --   04/11/24 1609 (!) 189/90 97.9  F (36.6  C) Temporal 78 16 98 % 88.4 kg (194 lb 12.8 oz)       Wt Readings from Last 3 Encounters:   04/11/24 88.4 kg (194 lb 12.8 oz)       CrCl cannot be calculated (Unknown ideal weight.).    PHYSICAL EXAM    Constitutional:  Well developed, Well nourished, NAD  HENT:  Normocephalic, Atraumatic, Bilateral external ears normal, Nose normal. Neck- Supple, No stridor.   Eyes:  PERRL, EOMI, Conjunctiva normal, No discharge.  Respiratory:  Normal breath sounds, No respiratory distress, No wheezing, Speaks full sentences easily. No cough.   Cardiovascular:  Normal heart rate, Regular rhythm, No murmurs, No rubs, No gallops. +slight left sided Chest wall tenderness.   GI:  No excessive obesity.  Bowel sounds normal, Soft, No tenderness, No masses, No flank tenderness. No rebound or guarding.   : deferred  Musculoskeletal:  No cyanosis, No clubbing. Good range of motion in all major joints. No tenderness to palpation or major deformities noted.   Integument:  Warm, Dry, No erythema, No rash.  No petechiae.   Neurologic:  Alert & oriented, +resting tremor to right hand  Psychiatric:  Affect normal, Cooperative         LAB:  All pertinent labs reviewed and interpreted.  Recent Results (from the past 24 hour(s))   Basic metabolic panel    Collection Time: 04/11/24  4:26 PM   Result Value Ref Range    Sodium 145 135 - 145 mmol/L  "   Potassium 4.8 3.4 - 5.3 mmol/L    Chloride 104 98 - 107 mmol/L    Carbon Dioxide (CO2) 29 22 - 29 mmol/L    Anion Gap 12 7 - 15 mmol/L    Urea Nitrogen 15.9 8.0 - 23.0 mg/dL    Creatinine 0.91 0.67 - 1.17 mg/dL    GFR Estimate 84 >60 mL/min/1.73m2    Calcium 9.8 8.8 - 10.2 mg/dL    Glucose 144 (H) 70 - 99 mg/dL   Troponin T, High Sensitivity    Collection Time: 04/11/24  4:26 PM   Result Value Ref Range    Troponin T, High Sensitivity 18 <=22 ng/L   CBC with platelets and differential    Collection Time: 04/11/24  4:26 PM   Result Value Ref Range    WBC Count 8.2 4.0 - 11.0 10e3/uL    RBC Count 4.48 4.40 - 5.90 10e6/uL    Hemoglobin 14.2 13.3 - 17.7 g/dL    Hematocrit 45.2 40.0 - 53.0 %     (H) 78 - 100 fL    MCH 31.7 26.5 - 33.0 pg    MCHC 31.4 (L) 31.5 - 36.5 g/dL    RDW 13.7 10.0 - 15.0 %    Platelet Count 245 150 - 450 10e3/uL    % Neutrophils 68 %    % Lymphocytes 20 %    % Monocytes 11 %    % Eosinophils 1 %    % Basophils 0 %    % Immature Granulocytes 0 %    NRBCs per 100 WBC 0 <1 /100    Absolute Neutrophils 5.5 1.6 - 8.3 10e3/uL    Absolute Lymphocytes 1.6 0.8 - 5.3 10e3/uL    Absolute Monocytes 0.9 0.0 - 1.3 10e3/uL    Absolute Eosinophils 0.1 0.0 - 0.7 10e3/uL    Absolute Basophils 0.0 0.0 - 0.2 10e3/uL    Absolute Immature Granulocytes 0.0 <=0.4 10e3/uL    Absolute NRBCs 0.0 10e3/uL   Extra Blue Top Tube    Collection Time: 04/11/24  4:26 PM   Result Value Ref Range    Hold Specimen JIC    Extra Red Top Tube    Collection Time: 04/11/24  4:26 PM   Result Value Ref Range    Hold Specimen JIC    Nt probnp inpatient    Collection Time: 04/11/24  4:26 PM   Result Value Ref Range    N terminal Pro BNP Inpatient 161 0 - 1,800 pg/mL       No results found for: \"ABORH\"        RADIOLOGY:  Reviewed all pertinent imaging. Please see official radiology report.    CT Chest Pulmonary Embolism w Contrast   Final Result   IMPRESSION:   1.  Approximately 20-25% pneumothorax seen on the left.      2.  Minimal " pneumomediastinum.      3.  Mild bibasilar atelectasis with tiny left pleural effusion.      4.  Tiny esophageal hiatal hernia.      5.  Benign cysts in the kidneys; no follow-up recommended.      6.  No PE, dissection, or aneurysm.      Pneumothorax is a critical finding and this was called to Dr. Holland at approximately 6:09 PM on 04/11/2024 CST.      XR Chest 2 Views   Final Result   IMPRESSION: Pleural-based curvilinear fibrosis in the left lung base is unchanged. Right lung is clear. No signs of pneumonia or failure. Heart and pulmonary vascularity are normal.      Marked exaggeration of thoracic kyphosis with anterior wedge compression fractures in the midthoracic spine.            EKG:    Indication: Chest pain    Performed at: 16:16p  Impression: Sinus rhythm at 75 bpm.  Flipped T waves in lead aVR, aVL and V1 no ST elevation appreciated.  GA interval 212 ms consistent with first-degree AV block.   ms, QTc 446 ms.  Nonspecific ST changes compared to December 15, 2020.      I have independently reviewed and interpreted the EKG(s) documented above.        PROCEDURES:  none    Medical Decision Making  Obtained supplemental history:Supplemental history obtained?: Documented in chart and Family Member/Significant Other  Reviewed external records: External records reviewed?: No  Care impacted by chronic illness:Anticoagulated State and Other: PE and DVT  Care significantly affected by social determinants of health:N/A  Did you consider but not order tests?: Work up considered but not performed and documented in chart, if applicable  Did you interpret images independently?: Independent interpretation of ECG and images noted in documentation, when applicable.  Consultation discussion with other provider:Did you involve another provider (consultant, , pharmacy, etc.)?: I discussed the care with another health care provider, see documentation for details.  Admit.    Lani Holland M.D. [facility]  Emergency  Medicine and Medical Toxicology  [facility] EMERGENCY DEPARTMENT  [facility] [facility] [id number] [phone]  Dept: [phone]           Lani Holland MD  04/11/24 0477

## 2024-04-11 NOTE — ED TRIAGE NOTES
Pt reports sharp non radiating chest pain since 0200 yesterday. Pt reports its intermittent. Also endorses SOB.      Triage Assessment (Adult)       Row Name 04/11/24 1845          Triage Assessment    Airway WDL WDL        Respiratory WDL    Respiratory WDL X;rhythm/pattern     Rhythm/Pattern, Respiratory shortness of breath;tachypneic        Skin Circulation/Temperature WDL    Skin Circulation/Temperature WDL WDL        Cardiac WDL    Cardiac WDL X;chest pain        Chest Pain Assessment    Chest Pain Location anterior chest, left     Character sharp        Peripheral/Neurovascular WDL    Peripheral Neurovascular WDL WDL        Cognitive/Neuro/Behavioral WDL    Cognitive/Neuro/Behavioral WDL WDL

## 2024-04-12 ENCOUNTER — APPOINTMENT (OUTPATIENT)
Dept: RADIOLOGY | Facility: HOSPITAL | Age: 84
DRG: 200 | End: 2024-04-12
Attending: INTERNAL MEDICINE
Payer: COMMERCIAL

## 2024-04-12 PROBLEM — H91.90 HEARING LOSS: Status: ACTIVE | Noted: 2020-04-23

## 2024-04-12 PROBLEM — I10 HYPERTENSION: Status: ACTIVE | Noted: 2020-04-23

## 2024-04-12 PROCEDURE — 99207 PR APP CREDIT; MD BILLING SHARED VISIT: CPT

## 2024-04-12 PROCEDURE — G0378 HOSPITAL OBSERVATION PER HR: HCPCS

## 2024-04-12 PROCEDURE — 71045 X-RAY EXAM CHEST 1 VIEW: CPT

## 2024-04-12 PROCEDURE — 250N000013 HC RX MED GY IP 250 OP 250 PS 637: Performed by: INTERNAL MEDICINE

## 2024-04-12 PROCEDURE — 120N000001 HC R&B MED SURG/OB

## 2024-04-12 PROCEDURE — 99232 SBSQ HOSP IP/OBS MODERATE 35: CPT | Mod: FS | Performed by: EMERGENCY MEDICINE

## 2024-04-12 PROCEDURE — 999N000157 HC STATISTIC RCP TIME EA 10 MIN

## 2024-04-12 PROCEDURE — 99223 1ST HOSP IP/OBS HIGH 75: CPT | Performed by: INTERNAL MEDICINE

## 2024-04-12 PROCEDURE — 71045 X-RAY EXAM CHEST 1 VIEW: CPT | Mod: 77

## 2024-04-12 RX ORDER — AZITHROMYCIN 250 MG/1
500 TABLET, FILM COATED ORAL DAILY
Qty: 6 TABLET | Refills: 0 | Status: DISCONTINUED | OUTPATIENT
Start: 2024-04-12 | End: 2024-04-13 | Stop reason: HOSPADM

## 2024-04-12 RX ORDER — HYDRALAZINE HYDROCHLORIDE 20 MG/ML
10 INJECTION INTRAMUSCULAR; INTRAVENOUS EVERY 6 HOURS PRN
Status: DISCONTINUED | OUTPATIENT
Start: 2024-04-12 | End: 2024-04-13 | Stop reason: HOSPADM

## 2024-04-12 RX ADMIN — AZITHROMYCIN DIHYDRATE 500 MG: 250 TABLET, FILM COATED ORAL at 16:01

## 2024-04-12 RX ADMIN — GABAPENTIN 200 MG: 100 CAPSULE ORAL at 19:33

## 2024-04-12 RX ADMIN — CARBIDOPA AND LEVODOPA 1 TABLET: 25; 100 TABLET ORAL at 09:29

## 2024-04-12 RX ADMIN — CARBIDOPA AND LEVODOPA 1 TABLET: 25; 100 TABLET ORAL at 19:00

## 2024-04-12 RX ADMIN — SIMVASTATIN 40 MG: 40 TABLET, FILM COATED ORAL at 21:03

## 2024-04-12 RX ADMIN — CARBIDOPA AND LEVODOPA 1 TABLET: 50; 200 TABLET, EXTENDED RELEASE ORAL at 14:51

## 2024-04-12 RX ADMIN — LISINOPRIL 20 MG: 20 TABLET ORAL at 08:19

## 2024-04-12 RX ADMIN — ACETAMINOPHEN 650 MG: 325 TABLET ORAL at 05:18

## 2024-04-12 RX ADMIN — GABAPENTIN 200 MG: 100 CAPSULE ORAL at 14:51

## 2024-04-12 RX ADMIN — GABAPENTIN 200 MG: 100 CAPSULE ORAL at 08:19

## 2024-04-12 RX ADMIN — CARBIDOPA AND LEVODOPA 1 TABLET: 25; 100 TABLET ORAL at 14:51

## 2024-04-12 RX ADMIN — CARBIDOPA AND LEVODOPA 1 TABLET: 50; 200 TABLET, EXTENDED RELEASE ORAL at 19:00

## 2024-04-12 RX ADMIN — CARBIDOPA AND LEVODOPA 1 TABLET: 25; 100 TABLET ORAL at 04:37

## 2024-04-12 RX ADMIN — PANTOPRAZOLE SODIUM 40 MG: 20 TABLET, DELAYED RELEASE ORAL at 16:01

## 2024-04-12 RX ADMIN — ACETAMINOPHEN 650 MG: 325 TABLET ORAL at 00:00

## 2024-04-12 RX ADMIN — PANTOPRAZOLE SODIUM 40 MG: 20 TABLET, DELAYED RELEASE ORAL at 08:19

## 2024-04-12 RX ADMIN — CARBIDOPA AND LEVODOPA 1 TABLET: 50; 200 TABLET, EXTENDED RELEASE ORAL at 09:29

## 2024-04-12 RX ADMIN — CARBIDOPA AND LEVODOPA 1 TABLET: 50; 200 TABLET, EXTENDED RELEASE ORAL at 04:37

## 2024-04-12 ASSESSMENT — ACTIVITIES OF DAILY LIVING (ADL)
ADLS_ACUITY_SCORE: 35
ADLS_ACUITY_SCORE: 20
ADLS_ACUITY_SCORE: 21
ADLS_ACUITY_SCORE: 20
ADLS_ACUITY_SCORE: 20
ADLS_ACUITY_SCORE: 21
ADLS_ACUITY_SCORE: 20
ADLS_ACUITY_SCORE: 21
ADLS_ACUITY_SCORE: 20
ADLS_ACUITY_SCORE: 21
ADLS_ACUITY_SCORE: 20
ADLS_ACUITY_SCORE: 35
ADLS_ACUITY_SCORE: 35
ADLS_ACUITY_SCORE: 20
ADLS_ACUITY_SCORE: 21
ADLS_ACUITY_SCORE: 21
ADLS_ACUITY_SCORE: 20
ADLS_ACUITY_SCORE: 20
DEPENDENT_IADLS:: INDEPENDENT
ADLS_ACUITY_SCORE: 21
ADLS_ACUITY_SCORE: 35
ADLS_ACUITY_SCORE: 35

## 2024-04-12 NOTE — PLAN OF CARE
----- Message from DELMY Suárez sent at 11/2/2023 12:18 PM EDT -----  Let patient know labs are better  ----- Message -----  From: Lab, Background User  Sent: 11/1/2023   3:24 PM EDT  To: DELMY Suárez       PRIMARY DIAGNOSIS: ACUTE PAIN  OUTPATIENT/OBSERVATION GOALS TO BE MET BEFORE DISCHARGE:  1. Pain Status: Improved-controlled with oral pain medications.    2. Return to near baseline physical activity: Yes    3. Cleared for discharge by consultants (if involved): No    Discharge Planner Nurse   Safe discharge environment identified: Yes  Barriers to discharge: Yes       Entered by: Nena Peres RN 04/12/2024 7:54 AM     Please review provider order for any additional goals.   Nurse to notify provider when observation goals have been met and patient is ready for discharge.Goal Outcome Evaluation:      Plan of Care Reviewed With: patient

## 2024-04-12 NOTE — UTILIZATION REVIEW
Inpatient appropriate    Admission Status; Secondary Review Determination       Under the authority of the Utilization Management Committee, the utilization review process indicated a secondary review on the above patient. The review outcome is based on review of the medical records, discussions with staff, and applying clinical experience noted on the date of the review.     (x) Inpatient Status Appropriate - This patient's medical care is consistent with medical management for inpatient care and reasonable inpatient medical practice.     RATIONALE FOR DETERMINATION   84 years old male with past medical history significant for hypertension and Parkinson disease presented to emergency department with a sudden onset of left-sided chest pain.  Patient found to have spontaneous left pneumothorax.  Patient is still on high oxygen supplement 15 L/min.    At the time of admission with the information available to the attending physician more than 2 nights Hospital complex care was anticipated, based on patient risk of adverse outcome if treated as outpatient and complex care required. Inpatient admission is appropriate based on the Medicare guidelines.     The information on this document is developed by the utilization review team in order for the business office to ensure compliance. This only denotes the appropriateness of proper admission status and does not reflect the quality of care rendered.   The definitions of Inpatient Status and Observation Status used in making the determination above are those provided in the CMS Coverage Manual, Chapter 1 and Chapter 6, section 70.4.   Sincerely,   Kings Drake MD  Utilization Review  Physician Advisor  [facility].

## 2024-04-12 NOTE — PROGRESS NOTES
[facility]    Medicine Progress Note - Hospitalist Service    Date of Admission:  4/11/2024    Assessment & Plan   Bernard Sanz is a 84 year old male admitted for sudden onset of left-sided chest pain, found to have spontaneous pneumothorax.     Left pneumothorax  -- CT Chest: small-to-moderate-sized left pneumothorax, greatest radial dimension of the pneumothorax is approximately 1.7 cm in the upper left hemithorax. Minimal pneumomediastinum   -- Repeat CXR this AM and afternoon, appear stable in size  -- continue oxygen at 100% FiO2    -- Pulm consult   - continue O2   - start Zpak for possible URI     L sided chest pain   Suspect secondary to the above  -- Trop flat    -- EKG reviewed; no acute ischemic changes   -- Telemonitoring     H/o PE, DVT - anticoagulated on Xarelto   -- okay to resume for now given no clear plan for CT     Parkinson disease: Continue home medication regimen    Hypertension: Continue home lisinopril           Diet: Combination Diet Regular Diet Adult    DVT Prophylaxis: Home Xarelto   Novak Catheter: Not present  Lines: None     Cardiac Monitoring: None  Code Status: Full Code      Clinically Significant Risk Factors Present on Admission               # Drug Induced Coagulation Defect: home medication list includes an anticoagulant medication    # Hypertension: Noted on problem list      # Overweight: Estimated body mass index is 27.6 kg/m  as calculated from the following:    Height as of this encounter: 1.829 m (6').    Weight as of this encounter: 92.3 kg (203 lb 7.8 oz).              Disposition Plan     Medically Ready for Discharge: Anticipated Tomorrow       The patient's care was discussed with the Attending Physician, Dr. Santana Gerber who independently met with and assessed the patient and is in agreement with the assessment and plan     Candace Schwab PA-C  Hospitalist Service  [facility]  Securely message with [employer] [place]  info)  Text page via [facility] Paging/Directory   ______________________________________________________________________    Interval History   Patient states he feels like chest pain has completely resolved today. Was located to the left side but since being admitted feels it has resolved. No previous PNX hx. No dizziness, lightheadedness, SOB, cough, chest pain. Hx of PE chronically anticoagulated on Xarelto. Agreeable to pulm consult, updated wife at bedside.     Physical Exam   Vital Signs: Temp: 97.6  F (36.4  C) Temp src: Oral BP: (!) 142/80 Pulse: 65   Resp: 18 SpO2: 100 % O2 Device: Non-rebreather mask Oxygen Delivery: 12 LPM  Weight: 203 lbs 7.75 oz    Constitutional: awake, alert, no apparent distress, and appears stated age  Respiratory: No increased work of breathing, no accessory muscle use, clear to auscultation bilaterally, no crackles or wheezing. Oxymask in place  Cardiovascular: Regular rate and rhythm, normal S1 and S2, no S3 or S4, and no murmur noted  Skin: Normal skin color, texture, turgor. No rashes and no jaundice  Musculoskeletal: no lower extremity pitting edema present. Bilateral calves are NT, symmetric.  Neurologic: Awake, alert.   Neuropsychiatric: Appropriate mood, affect and eye contact. Cooperative.    Medical Decision Making             Data     I have personally reviewed the following data over the past 24 hrs:    Trop: 17 BNP: N/A       Imaging results reviewed over the past 24 hrs:   Recent Results (from the past 24 hour(s))   XR Chest 1 View    Narrative    EXAM: XR CHEST 1 VIEW  LOCATION: [facility]  DATE: 4/12/2024    INDICATION: spontaneous pneumothorax, f u CXR after all night on 100  O2  COMPARISON: 04/11/2024      Impression    IMPRESSION: The heart is enlarged. A small left lobe pneumothorax is again seen, accounting for technical differences similar to prior CT. Atelectasis and volume loss is again noted in the left lower lobe and lingula, similar to  prior study.   XR Chest Port 1 View    Narrative    EXAM: XR CHEST PORT 1 VIEW  LOCATION: [facility]  DATE: 4/12/2024    INDICATION: Follow up left side pneumothorax  COMPARISON: 04/12/2024      Impression    IMPRESSION: Apparent mildly improved size of persistent small left pneumothorax could be from differences in positioning. Moderately hypoexpanded lungs. Basilar atelectasis. No new or enlarging pleural effusion. Stable cardiomediastinal silhouette.

## 2024-04-12 NOTE — CONSULTS
Care Management Initial Consult    General Information  Assessment completed with: Patient, Spouse or significant other, pt and spouse  Type of CM/SW Visit: Initial Assessment    Primary Care Provider verified and updated as needed: Yes   Readmission within the last 30 days: no previous admission in last 30 days      Reason for Consult: discharge planning  Advance Care Planning: Advance Care Planning Reviewed: verified with patient, no concerns identified     General Information Comments: lives w/spouse and ind at baseline    Communication Assessment  Patient's communication style: spoken language (English or Bilingual)    Hearing Difficulty or Deaf: no   Wear Glasses or Blind: yes    Cognitive  Cognitive/Neuro/Behavioral: WDL                      Living Environment:   People in home: spouse     Current living Arrangements: house      Able to return to prior arrangements: yes       Family/Social Support:  Care provided by: self  Provides care for: no one  Marital Status:   Wife  Elham       Description of Support System: Supportive, Involved    Support Assessment: Adequate family and caregiver support, Adequate social supports    Current Resources:   Patient receiving home care services: No     Community Resources: None  Equipment currently used at home: none  Supplies currently used at home: None    Employment/Financial:  Employment Status: retired        Financial Concerns: none   Referral to Financial Worker: No       Does the patient's insurance plan have a 3 day qualifying hospital stay waiver?  No    Lifestyle & Psychosocial Needs:  Social Determinants of Health     Food Insecurity: No Food Insecurity (10/26/2023)    Received from [email or web address]    Food Insecurity     Worried About Running Out of Food in the Last Year: 1   Depression: At risk (11/15/2023)    Received from [email or web address]    PHQ-2     PHQ-2 TOTAL SCORE: 4   Housing Stability: Low  Risk  (10/26/2023)    Received from [facility]    Housing Stability     Unable to Pay for Housing in the Last Year: 1   Tobacco Use: Medium Risk (11/22/2023)    [facility]    Patient History     Smoking Tobacco Use: Former     Smokeless Tobacco Use: Never     Passive Exposure: Not on file   Financial Resource Strain: Low Risk  (10/26/2023)    [facility]    Financial Resource Strain     Difficulty of Paying Living Expenses: 3     Difficulty of Paying Living Expenses: Not on file   Alcohol Use: Not on file   Transportation Needs: No Transportation Needs (10/26/2023)    Received from [facility]    Transportation Needs     Lack of Transportation (Medical): 1   Physical Activity: Not on file   Interpersonal Safety: Not on file   Stress: Not on file   Social Connections: Socially Integrated (10/26/2023)    Received from [facility]    Social Connections     Frequency of Communication with Friends and Family: 0   Health Literacy: Not on file       Functional Status:  Prior to admission patient needed assistance:   Dependent ADLs:: Independent  Dependent IADLs:: Independent  Assesssment of Functional Status: Not at  functional baseline    Mental Health Status:  Mental Health Status: No Current Concerns       Chemical Dependency Status:                Values/Beliefs:  Spiritual, Cultural Beliefs, [employer] Practices, Values that affect care:                 Additional Information:  Assessed, lives w/spouse, independent at baseline and no svcs. Wife to transport. Discussed [facility]. CM to follow for discharge needs.    Nirmal Saldaña RN

## 2024-04-12 NOTE — PLAN OF CARE
"  Problem: Adult Inpatient Plan of Care  Goal: Patient-Specific Goal (Individualized)  Description: You can add care plan individualizations to a care plan. Examples of Individualization might be:  \"Parent requests to be called daily at 9am for status\", \"I have a hard time hearing out of my right ear\", or \"Do not touch me to wake me up as it startles  me\".  Outcome: Progressing     Problem: Pain Acute  Goal: Optimal Pain Control and Function  Outcome: Progressing  Intervention: Develop Pain Management Plan  Recent Flowsheet Documentation  Taken 4/12/2024 0518 by Nena Peres, RN  Pain Management Interventions:   medication (see MAR)   rest   Goal Outcome Evaluation:      Plan of Care Reviewed With: patient        Blood pressure high on patient's left arm, 180's to 190's. Better on the right arm, in the 150's systolic. Patient medicated for left chest pain, Tylenol helpful. Patient on 12 liters oxygen via nonrebreather mask, saturating high 90's to 100's. Up to bed side commode with standby assistance.         "

## 2024-04-12 NOTE — PLAN OF CARE
PRIMARY DIAGNOSIS: Pneumothorax  OUTPATIENT/OBSERVATION GOALS TO BE MET BEFORE DISCHARGE:  1. Ruled out acute coronary syndrome (negative or stable Troponin):  Yes  2. Pain Status: Pain free.  3. Appropriate provocative testing performed: Yes  - Stress Test Procedure: NO  - Interpretation of cardiac rhythm per telemetry tech: NS    4. Cleared by Consultants (if applicable):No  5. Return to near baseline physical activity: No  Discharge Planner Nurse   Safe discharge environment identified: Yes  Barriers to discharge: Yes       Entered by: Trinity Garcia RN 04/12/2024 10:21 AM  Pt is alert and oriented denies pain had breakfast family is with patient. Pt is on tele with NS rhythm.

## 2024-04-12 NOTE — ED NOTES
Pt moved to inpatient hospital bed. Assisted x1 to bedside commode. Pt endorsing increased SOB with moving.

## 2024-04-12 NOTE — PLAN OF CARE
PRIMARY DIAGNOSIS: ACUTE PAIN  OUTPATIENT/OBSERVATION GOALS TO BE MET BEFORE DISCHARGE:  1. Pain Status: Improved-controlled with oral pain medications.    2. Return to near baseline physical activity: No    3. Cleared for discharge by consultants (if involved): No    Discharge Planner Nurse   Safe discharge environment identified: No  Barriers to discharge: Yes       Entered by: Beth Alexander RN 04/12/2024 4:06 AM     Please review provider order for any additional goals.   Nurse to notify provider when observation goals have been met and patient is ready for discharge.Goal Outcome Evaluation:

## 2024-04-12 NOTE — PHARMACY-ADMISSION MEDICATION HISTORY
Pharmacist Admission Medication History    Admission medication history is complete. The information provided in this note is only as accurate as the sources available at the time of the update.    Information Source(s): Patient, Family member, and Clinic records via in-person    Pertinent Information: patient needs evening meds    Changes made to PTA medication list:  Added: Xarelto, gabapentin, lidocaine patch  Deleted: trazodone  Changed: Sinemet    Allergies reviewed with patient and updates made in EHR: yes    Medication History Completed By: Susana Truong RPH 4/11/2024 8:17 PM    PTA Med List   Medication Sig Last Dose    carbidopa-levodopa (SINEMET CR)  MG CR tablet 1 tablet 4 times daily 0430, 10AM, 2PM, 6PM  Takes with carbidopa-levodopa 25/100 at same times 4/11/2024 at x 3    carbidopa-levodopa (SINEMET)  MG tablet Take 1 tablet by mouth 4 times daily 0430, 1000, 1400, 1800  Takes at same time as Sinemet CR 50/200 4/11/2024 at x 3    gabapentin (NEURONTIN) 100 MG capsule Take 200 mg by mouth 3 times daily 4/11/2024 at x2    Lidocaine (LIDOCARE) 4 % Patch Place 1 patch onto the skin every 24 hours To prevent lidocaine toxicity, patient should be patch free for 12 hrs daily. 4/11/2024 at am    lisinopril (ZESTRIL) 20 MG tablet Take 20 mg by mouth daily 4/11/2024 at am    omeprazole (PRILOSEC) 20 MG DR capsule 20 mg 2 times daily 4/11/2024 at am    rivaroxaban ANTICOAGULANT (XARELTO) 20 MG TABS tablet Take 20 mg by mouth daily (with dinner) 4/10/2024 at pm    simvastatin (ZOCOR) 40 MG tablet Take 40 mg by mouth at bedtime 4/10/2024 at pm

## 2024-04-12 NOTE — H&P
[facility]    History and Physical - Hospitalist Service       Date of Admission:  4/11/2024    Assessment & Plan      Bernard Sanz is a 84 year old male admitted for sudden onset of left-sided chest pain.    Problem list:  Left pneumothorax: Patient stable, no notable change in the hours monitored in ED.  Pulmonary advised for mask oxygen at 100% FiO2.  Recheck chest x-ray in the a.m.  Patient reports feeling some better already.  Parkinson disease: Continue home medication regimen  Hypertension: Continue home meds          Diet: Combination Diet Regular Diet Adult  DVT Prophylaxis: DOAC  Novak Catheter: Not present  Lines: None     Cardiac Monitoring: None  Code Status: Full Code    Clinically Significant Risk Factors Present on Admission               # Drug Induced Coagulation Defect: home medication list includes an anticoagulant medication    # Hypertension: Noted on problem list      # Overweight: Estimated body mass index is 26.42 kg/m  as calculated from the following:    Height as of this encounter: 1.829 m (6').    Weight as of this encounter: 88.4 kg (194 lb 12.8 oz).              Disposition Plan     Medically Ready for Discharge: Anticipated in 2-4 Days           Van Smith MD  Hospitalist Service  [facility]  Securely message with [employer] (more info)  Text page via [employer] Paging/Directory     ______________________________________________________________________    Chief Complaint   Sudden onset left-sided chest pain    History is obtained from the patient    History of Present Illness   Bernard Sanz is a 84 year old male with Parkinson's well-controlled, lives at home with his wife, was awakened last night around 2 AM with sudden onset left-sided chest pain.  This has persisted into the day and he came to the ED for evaluation.  No evidence for coronary ischemia but was found to have a left pneumothorax.  Denies recent trauma.  Quit smoking 40 years  ago.      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    DJD tarsometatarsal joint right foot    DVT (deep venous thrombosis) (H) 10/2013    left leg with progression to pulmonary emboli    Erectile dysfunction     GERD (gastroesophageal reflux disease)     Hearing loss of both ears     High cholesterol     Per pt conversation    HTN (hypertension)     Hypercholesteremia     Hypertension     Kidney stone     Kidney stones 10/16/2013    Parkinson disease (H) 10/26/2015    Parkinson's disease (H)     Plantar fasciitis of right foot 10/2019    Prostate cancer (H) 10/16/2013    Pulmonary emboli (H)        Past Surgical History   Past Surgical History:   Procedure Laterality Date    APPENDECTOMY      Per pt conversation    Musa's esophagus s/p ablation      CHOLECYSTECTOMY      CHOLECYSTECTOMY      Per pt conversation    IR PULMONARY ANGIOGRAM BILATERAL  12/15/2020    IR PULMONARY ANGIOGRAM BILATERAL  12/15/2020    PROSTATECTOMY      Per pt conversation    TONSILLECTOMY      Per pt conversation       Prior to Admission Medications   Prior to Admission Medications   Prescriptions Last Dose Informant Patient Reported? Taking?   Lidocaine (LIDOCARE) 4 % Patch 2024 at am  Yes Yes   Sig: Place 1 patch onto the skin every 24 hours To prevent lidocaine toxicity, patient should be patch free for 12 hrs daily.   carbidopa-levodopa (SINEMET CR)  MG CR tablet 2024 at x 3  Yes Yes   Si tablet 4 times daily 0430, 10AM, 2PM, 6PM  Takes with carbidopa-levodopa 25/100 at same times   carbidopa-levodopa (SINEMET)  MG tablet 2024 at x 3  Yes Yes   Sig: Take 1 tablet by mouth 4 times daily 0430, 1000, 1400, 1800  Takes at same time as Sinemet CR 50/200    gabapentin (NEURONTIN) 100 MG capsule 2024 at x2  Yes Yes   Sig: Take 200 mg by mouth 3 times daily   lisinopril (ZESTRIL) 20 MG tablet 2024 at am  Yes Yes   Sig: Take 20 mg by mouth daily   omeprazole (PRILOSEC) 20 MG DR capsule 2024 at am  Yes Yes   Si mg 2 times daily   rivaroxaban ANTICOAGULANT (XARELTO) 20 MG TABS tablet 4/10/2024 at pm  Yes Yes   Sig: Take 20 mg by mouth daily (with dinner)   simvastatin (ZOCOR) 40 MG tablet 4/10/2024 at pm  Yes Yes   Sig: Take 40 mg by mouth at bedtime      Facility-Administered Medications: None        Review of Systems    The 5 point Review of Systems is negative other than noted in the HPI or here.    Social History   I have reviewed this patient's social history and updated it with pertinent information if needed.  Social History     Tobacco Use    Smoking status: Former    Smokeless tobacco: Never   Substance Use Topics    Alcohol use: Yes     Comment: Alcoholic Drinks/day: 3x per week         Allergies   Allergies   Allergen Reactions    Tetracycline         Physical Exam   Vital Signs: Temp: 98.4  F (36.9  C) Temp src: Axillary BP: (!) 156/78 Pulse: 65   Resp: 22 SpO2: 100 % O2 Device: Non-rebreather mask Oxygen Delivery: 12 LPM  Weight: 194 lbs 12.8 oz    Constitutional: awake, alert, cooperative, no apparent distress, and appears stated age  Respiratory: No increased work of breathing, good air exchange, clear to auscultation bilaterally, no crackles or wheezing  Cardiovascular: Normal apical impulse, regular rate and rhythm, normal S1 and S2, no S3 or S4, and no murmur noted  GI: No scars, normal bowel sounds, soft, non-distended, non-tender, no masses palpated, no hepatosplenomegally  Neurologic: Awake, alert, oriented to name, place and time.  Cranial nerves II-XII are grossly intact.  Motor is 5 out of 5 bilaterally.  Cerebellar finger to nose, heel to shin intact.  Sensory is intact.  Babinski down going, Romberg negative, and gait is  normal.    Medical Decision Making       55 MINUTES SPENT BY ME on the date of service doing chart review, history, exam, documentation & further activities per the note.      Data   ------------------------- PAST 24 HR DATA REVIEWED -----------------------------------------------    I have personally reviewed the following data over the past 24 hrs:    8.2  \   14.2   / 245     145 104 15.9 /  144 (H)   4.8 29 0.91 \     Trop: 17 BNP: 161       Imaging results reviewed over the past 24 hrs:   Recent Results (from the past 24 hour(s))   XR Chest 2 Views    Narrative    EXAM: XR CHEST 2 VIEWS  LOCATION: [facility]  DATE: 4/11/2024    INDICATION: sob  COMPARISON: CT AP 08/29/2023      Impression    IMPRESSION: Pleural-based curvilinear fibrosis in the left lung base is unchanged. Right lung is clear. No signs of pneumonia or failure. Heart and pulmonary vascularity are normal.    Marked exaggeration of thoracic kyphosis with anterior wedge compression fractures in the midthoracic spine.   CT Chest Pulmonary Embolism w Contrast    Narrative    EXAM: CT CHEST PULMONARY EMBOLISM W CONTRAST  LOCATION: [facility]  DATE: 4/11/2024    INDICATION: Left chest pain, h/o DVT and PE (has missed his anticoag lately).  COMPARISON: 12/15/2020.  TECHNIQUE: CT chest pulmonary angiogram during arterial phase injection of IV contrast. Multiplanar reformats and MIP reconstructions were performed. Dose reduction techniques were used.   CONTRAST: 90 mL Isovue-370.    FINDINGS: There is some mild loss of detail given patient motion.    ANGIOGRAM CHEST: Pulmonary arteries are normal caliber and negative for pulmonary emboli. Thoracic aorta is negative for dissection. No CT evidence of right heart strain.    LUNGS AND PLEURA: There is a small-to-moderate-sized left pneumothorax. On today's correlative chest radiograph a pneumothorax is present, but not well seen given overlying rib in the  upper left hemithorax and atelectasis in the left lung base. The   greatest radial dimension of the pneumothorax is approximately 1.7 cm in the upper left hemithorax. There is some mild bibasilar atelectasis most marked on the left with no central airway obstruction. Tiny left pleural effusion.    MEDIASTINUM/AXILLAE: Minimal findings of gas in the mediastinum.    CORONARY ARTERY CALCIFICATION: Mild.    UPPER ABDOMEN: Tiny esophageal hiatal hernia. The gallbladder is surgically absent. Benign cysts in both kidneys, no follow-up recommended.    MUSCULOSKELETAL: Significant kyphotic curvature of the thoracic spine. No obvious rib fracture identified.      Impression    IMPRESSION:  1.  Approximately 20-25% pneumothorax seen on the left.    2.  Minimal pneumomediastinum.    3.  Mild bibasilar atelectasis with tiny left pleural effusion.    4.  Tiny esophageal hiatal hernia.    5.  Benign cysts in the kidneys; no follow-up recommended.    6.  No PE, dissection, or aneurysm.    Pneumothorax is a critical finding and this was called to Dr. Holland at approximately 6:09 PM on 04/11/2024 CST.

## 2024-04-12 NOTE — PLAN OF CARE
Pt alert t and oriented denies pain was oxygen with Oxy mask 15 liters. Pt had pulmonary consult. Pulmonary ordered X ray for possible chest tube. Pt transferred to p2.

## 2024-04-12 NOTE — CONSULTS
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   Presents to ED on 4/11 for evaluation of left side chest pain and shortness of breath, mild cough. Denies bouts of cough, denies falling down.   Patient was in mild distress, tachypneic, hypertensive, afebrile.   CXR showed left side pneumothorax. Chest CT scan showed 20-25% left side pneumothorax, minimal pneumomediastinum, mild lung cysts.   Patient was started on O2 supplementation. Follow up CXR showed small left pneumothorax, similar to previous images.  Pulmonary service consulted.     Spontaneous left side pneumothorax  Unclear culprit, hx Parkinson disease, denies recent falls. No rib fractures in CT scan   Denies recent respiratory infection. Reports mild cough. Denies bout of cough.   Chest T scan showed small lung cysts, previous tobacco use, likely the culprit.   Follow up CXR showed small apical pneumothorax < 2 cm.   Recommend a follow up CXR.   Start azithromycin for possible acute bronchitis. Continue PPI for GERD.   Small lung cysts   Previous tobacco user.   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    Daron Singh  Pulmonary / Critical Care  04/12/2024  1:58 PM      Reason for consult :  left side pneumothorax   HPI:  Bernard Sanz is a 84 year old male with history of  HTN, DVT and PE with RV strain, s/p mechanical thrombectomy on 12/15/2020, anticoagulated,  CVA, Parkinson's disease, former smoker  (quit 40 years ago).   Presents to ED for evaluation of left side chest pain and shortness of breath, mild cough. Patient was in mild distress, tachypneic, hypertensive, afebrile.   CXR showed left side pneumothorax. Chest CT scan showed 20-25% left side pneumothorax, minimal pneumomediastinum, mild lung cysts.   Patient was started on O2 supplementation. Follow up CXR showed small left pneumothorax, similar to previous images.  Pulmonary service consulted.     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    DJD tarsometatarsal joint right foot    DVT (deep venous thrombosis) (H) 10/2013    left leg with progression to pulmonary emboli    Erectile dysfunction     GERD (gastroesophageal reflux disease)     Hearing loss of both ears     High cholesterol     Per pt conversation    HTN (hypertension)     Hypercholesteremia     Hypertension     Kidney stone     Kidney stones 10/16/2013    Parkinson disease (H) 10/26/2015    Parkinson's disease (H)     Plantar fasciitis of right foot 10/2019    Prostate cancer (H) 10/16/2013    Pulmonary emboli (H) 2013         Allergies: Tetracycline     MEDS:  Current Facility-Administered Medications   Medication Dose Route Frequency Provider Last Rate Last Admin    acetaminophen (TYLENOL) tablet 650 mg  650 mg Oral Q4H PRN Van Smith MD   650 mg at 04/12/24 0518    Or    acetaminophen (TYLENOL) Suppository 650 mg  650 mg Rectal Q4H PRN Van Smith MD        calcium carbonate (TUMS) chewable tablet 1,000 mg  1,000 mg Oral 4x Daily PRN Van Smith MD        carbidopa-levodopa (SINEMET CR)  MG per CR tablet 1 tablet  1  tablet Oral 4x Daily Van Smith MD   1 tablet at 04/12/24 1451    carbidopa-levodopa (SINEMET)  MG per tablet 1 tablet  1 tablet Oral 4x Daily Van Smith MD   1 tablet at 04/12/24 1451    gabapentin (NEURONTIN) capsule 200 mg  200 mg Oral TID Van Smith MD   200 mg at 04/12/24 1451    lisinopril (ZESTRIL) tablet 20 mg  20 mg Oral Daily Van Smith MD   20 mg at 04/12/24 0819    pantoprazole (PROTONIX) EC tablet 40 mg  40 mg Oral BID AC Van Smith MD   40 mg at 04/12/24 0819    [Held by provider] rivaroxaban ANTICOAGULANT (XARELTO) tablet 20 mg  20 mg Oral Daily with supper Van Smith MD        senna-docusate (SENOKOT-S/PERICOLACE) 8.6-50 MG per tablet 1 tablet  1 tablet Oral BID PRN Van Smith MD        Or    senna-docusate (SENOKOT-S/PERICOLACE) 8.6-50 MG per tablet 2 tablet  2 tablet Oral BID PRN Van Smith MD        simvastatin (ZOCOR) tablet 40 mg  40 mg Oral At Bedtime Van Smith MD   40 mg at 04/11/24 2309     Social History     Socioeconomic History    Marital status:      Spouse name: Not on file    Number of children: Not on file    Years of education: Not on file    Highest education level: Not on file   Occupational History    Occupation: retired   Tobacco Use    Smoking status: Former    Smokeless tobacco: Never   Substance and Sexual Activity    Alcohol use: Yes     Comment: Alcoholic Drinks/day: 3x per week    Drug use: Not on file    Sexual activity: Not on file   Other Topics Concern    Not on file   Social History Narrative    Lives with wife in a home.   54 years.     Social Determinants of Health     Financial Resource Strain: Low Risk  (10/26/2023)    [facility]    Financial Resource Strain     Difficulty of Paying Living Expenses: 3     Difficulty of Paying Living Expenses: Not on file   Food Insecurity: No Food Insecurity (10/26/2023)    Received from [facility]    Food Insecurity     Worried About Running Out of Food in the Last Year: 1   Transportation Needs: No Transportation Needs (10/26/2023)    Received from [facility]    Transportation Needs     Lack of Transportation (Medical): 1   Physical Activity: Not on file   Stress: Not on file   Social Connections: Socially Integrated (10/26/2023)    Received from [facility]    Social Connections     Frequency of Communication with Friends and Family: 0   Interpersonal Safety: Not on file   Housing Stability: Low Risk  (10/26/2023)    Received from [facility]    Housing Stability     Unable to Pay for Housing in the Last Year: 1     Family History   Problem Relation Age of Onset    Cancer Father         throat    Diabetes Maternal Grandfather     Urolithiasis No family hx of     Clotting Disorder No family hx of     Gout No family hx of     Heart Disease No family hx of      ROS  - Twelve point review of systems were discussed with patient, positive finding in HPI        Objective:   VITALS:  BP (!) 147/78 (BP Location: Right arm)   Pulse 60   Temp 97.7  F (36.5  C) (Oral)   Resp 18   Ht 1.829 m (6')   Wt 92.3 kg (203 lb 7.8 oz)   SpO2 100%   BMI 27.60 kg/m    VENT:  FiO2 (%): 12 %  Resp: 18    EXAM:   Gen: awake, alert, no distress  HEENT: pink conjunctiva, moist mucosa, Mallampati II/IV  Neck: no thyromegaly, masses or JVD  Lungs: clear  CV: regular, no murmurs or gallops appreciated  Abdomen: soft, NT, BS wnl  Ext: no edema  Neuro: CN II-XII intact, non focal       Data Review:  Recent Labs   Lab 04/11/24  1626   *      04/11/24 16:26   Sodium 145   Potassium 4.8   Chloride 104   Carbon Dioxide (CO2) 29   Urea Nitrogen 15.9   Creatinine 0.91   GFR Estimate 84   Calcium 9.8   Anion Gap 12   Glucose 144 (H)   N-Terminal Pro BNP Inpatient 161      04/11/24 16:26   WBC 8.2   Hemoglobin 14.2    Hematocrit 45.2   Platelet Count 245   RBC Count 4.48    (H)   MCH 31.7   MCHC 31.4 (L)   RDW 13.7   % Neutrophils 68   % Lymphocytes 20   % Monocytes 11   % Eosinophils 1   % Basophils 0     XR CHEST 2 VIEWS  LOCATION: [facility]  DATE: 4/11/2024     INDICATION: sob  COMPARISON: CT AP 08/29/2023                                               IMPRESSION: Pleural-based curvilinear fibrosis in the left lung base is unchanged. Right lung is clear. No signs of pneumonia or failure. Heart and pulmonary vascularity are normal.   Marked exaggeration of thoracic kyphosis with anterior wedge compression fractures in the midthoracic spine.      CT CHEST PULMONARY EMBOLISM W CONTRAST  LOCATION: [facility]  DATE: 4/11/2024  INDICATION: Left chest pain, h/o DVT and PE (has missed his anticoag lately).  COMPARISON: 12/15/2020.  FINDINGS: There is some mild loss of detail given patient motion.  ANGIOGRAM CHEST: Pulmonary arteries are normal caliber and negative for pulmonary emboli. Thoracic aorta is negative for dissection. No CT evidence of right heart strain.  LUNGS AND PLEURA: There is a small-to-moderate-sized left pneumothorax. On today's correlative chest radiograph a pneumothorax is present, but not well seen given overlying rib in the upper left hemithorax and atelectasis in the left lung base. The   greatest radial dimension of the pneumothorax is approximately 1.7 cm in the upper left hemithorax. There is some mild bibasilar atelectasis most marked on the left with no central airway obstruction. Tiny left pleural effusion.  MEDIASTINUM/AXILLAE: Minimal findings of gas in the mediastinum.  CORONARY ARTERY CALCIFICATION: Mild.  UPPER ABDOMEN: Tiny esophageal hiatal hernia. The gallbladder is surgically absent. Benign cysts in both kidneys, no follow-up recommended.  MUSCULOSKELETAL: Significant kyphotic curvature of the thoracic spine. No obvious rib fracture  identified.  IMPRESSION:  1.  Approximately 20-25% pneumothorax seen on the left.  2.  Minimal pneumomediastinum.  3.  Mild bibasilar atelectasis with tiny left pleural effusion.  4.  Tiny esophageal hiatal hernia.  5.  Benign cysts in the kidneys; no follow-up recommended.   6.  No PE, dissection, or aneurysm.    XR CHEST 1 VIEW  LOCATION: [facility]  DATE: 4/12/2024   INDICATION: spontaneous pneumothorax, f u CXR after all night on 100  O2  COMPARISON: 04/11/2024  IMPRESSION: The heart is enlarged. A small left lobe pneumothorax is again seen, accounting for technical differences similar to prior CT. Atelectasis and volume loss is again noted in the left lower lobe and lingula, similar to prior study.    By:  Daron Singh MD, 04/12/2024  1:58 PM    Primary Care Physician:  Tammy Boyle

## 2024-04-13 ENCOUNTER — APPOINTMENT (OUTPATIENT)
Dept: RADIOLOGY | Facility: HOSPITAL | Age: 84
DRG: 200 | End: 2024-04-13
Attending: INTERNAL MEDICINE
Payer: COMMERCIAL

## 2024-04-13 VITALS
OXYGEN SATURATION: 95 % | WEIGHT: 203.48 LBS | SYSTOLIC BLOOD PRESSURE: 148 MMHG | BODY MASS INDEX: 27.56 KG/M2 | TEMPERATURE: 98.2 F | DIASTOLIC BLOOD PRESSURE: 96 MMHG | HEART RATE: 66 BPM | HEIGHT: 72 IN | RESPIRATION RATE: 18 BRPM

## 2024-04-13 LAB
ANION GAP SERPL CALCULATED.3IONS-SCNC: 8 MMOL/L (ref 7–15)
BUN SERPL-MCNC: 12.9 MG/DL (ref 8–23)
CALCIUM SERPL-MCNC: 9.1 MG/DL (ref 8.8–10.2)
CHLORIDE SERPL-SCNC: 107 MMOL/L (ref 98–107)
CREAT SERPL-MCNC: 0.66 MG/DL (ref 0.67–1.17)
DEPRECATED HCO3 PLAS-SCNC: 27 MMOL/L (ref 22–29)
EGFRCR SERPLBLD CKD-EPI 2021: >90 ML/MIN/1.73M2
GLUCOSE SERPL-MCNC: 100 MG/DL (ref 70–99)
POTASSIUM SERPL-SCNC: 4.1 MMOL/L (ref 3.4–5.3)
SODIUM SERPL-SCNC: 142 MMOL/L (ref 135–145)

## 2024-04-13 PROCEDURE — 99239 HOSP IP/OBS DSCHRG MGMT >30: CPT | Performed by: INTERNAL MEDICINE

## 2024-04-13 PROCEDURE — 80048 BASIC METABOLIC PNL TOTAL CA: CPT | Performed by: EMERGENCY MEDICINE

## 2024-04-13 PROCEDURE — 250N000013 HC RX MED GY IP 250 OP 250 PS 637: Performed by: INTERNAL MEDICINE

## 2024-04-13 PROCEDURE — 71045 X-RAY EXAM CHEST 1 VIEW: CPT

## 2024-04-13 PROCEDURE — 36415 COLL VENOUS BLD VENIPUNCTURE: CPT | Performed by: EMERGENCY MEDICINE

## 2024-04-13 RX ORDER — AZITHROMYCIN 500 MG/1
500 TABLET, FILM COATED ORAL DAILY
Qty: 2 TABLET | Refills: 0 | Status: SHIPPED | OUTPATIENT
Start: 2024-04-14 | End: 2024-04-16

## 2024-04-13 RX ADMIN — AZITHROMYCIN DIHYDRATE 500 MG: 250 TABLET, FILM COATED ORAL at 09:45

## 2024-04-13 RX ADMIN — CARBIDOPA AND LEVODOPA 1 TABLET: 25; 100 TABLET ORAL at 04:40

## 2024-04-13 RX ADMIN — CARBIDOPA AND LEVODOPA 1 TABLET: 25; 100 TABLET ORAL at 09:45

## 2024-04-13 RX ADMIN — CARBIDOPA AND LEVODOPA 1 TABLET: 50; 200 TABLET, EXTENDED RELEASE ORAL at 09:46

## 2024-04-13 RX ADMIN — LISINOPRIL 20 MG: 20 TABLET ORAL at 09:45

## 2024-04-13 RX ADMIN — CARBIDOPA AND LEVODOPA 1 TABLET: 50; 200 TABLET, EXTENDED RELEASE ORAL at 04:39

## 2024-04-13 RX ADMIN — GABAPENTIN 200 MG: 100 CAPSULE ORAL at 09:45

## 2024-04-13 RX ADMIN — PANTOPRAZOLE SODIUM 40 MG: 20 TABLET, DELAYED RELEASE ORAL at 11:07

## 2024-04-13 RX ADMIN — ACETAMINOPHEN 650 MG: 325 TABLET ORAL at 04:39

## 2024-04-13 ASSESSMENT — ACTIVITIES OF DAILY LIVING (ADL)
ADLS_ACUITY_SCORE: 21

## 2024-04-13 NOTE — PLAN OF CARE
RN discussed discharge instructions with patient.  AVS provided.  Patient and spouse verbalized understanding and voiced no concerns at this time.  Patient discharging home with spouse.

## 2024-04-13 NOTE — PLAN OF CARE
Problem: Adult Inpatient Plan of Care  Goal: Plan of Care Review  Description: The Plan of Care Review/Shift note should be completed every shift.  The Outcome Evaluation is a brief statement about your assessment that the patient is improving, declining, or no change.  This information will be displayed automatically on your shift  note.  Outcome: Progressing     Problem: Adult Inpatient Plan of Care  Goal: Absence of Hospital-Acquired Illness or Injury  Intervention: Prevent Skin Injury  Recent Flowsheet Documentation  Taken 4/12/2024 1546 by Viji José, RN  Device Skin Pressure Protection:   absorbent pad utilized/changed   tubing/devices free from skin contact  Taken 4/12/2024 1544 by Viji José, RN  Body Position: supine, head elevated     Problem: Adult Inpatient Plan of Care  Goal: Optimal Comfort and Wellbeing  Outcome: Progressing     Patient AxO x4. No complaints of pain at this time. Has been resting in bed this shift, with assistance to bathroom. Remains on oxymask 3L, with no complaints, O2 sat mid-high 90's. Refused supper. Takes medications well. Appears comfortable in bed.

## 2024-04-13 NOTE — PLAN OF CARE
"  Problem: Adult Inpatient Plan of Care  Goal: Plan of Care Review  Description: The Plan of Care Review/Shift note should be completed every shift.  The Outcome Evaluation is a brief statement about your assessment that the patient is improving, declining, or no change.  This information will be displayed automatically on your shift  note.  Outcome: Progressing     Problem: Adult Inpatient Plan of Care  Goal: Patient-Specific Goal (Individualized)  Description: You can add care plan individualizations to a care plan. Examples of Individualization might be:  \"Parent requests to be called daily at 9am for status\", \"I have a hard time hearing out of my right ear\", or \"Do not touch me to wake me up as it startles  me\".  Outcome: Progressing   Goal Outcome Evaluation:         Patient is alert and oriented, denies pains patient continue on 3L oxymask, VSS  100% , stand-by assist, and regular diet, No significant event.               " Universal Safety Interventions

## 2024-04-13 NOTE — DISCHARGE SUMMARY
[facility]  Hospitalist Discharge Summary      Date of Admission:  4/11/2024  Date of Discharge:  4/13/2024  Discharging Provider: Sree Carmona MD  Discharge Service: Hospitalist Service    Discharge Diagnoses   Left pneumothorax less than 2 cm  Left-sided chest pain  History of DVT PE  Parkinson's  Hypertension  Clinically Significant Risk Factors     # Overweight: Estimated body mass index is 27.6 kg/m  as calculated from the following:    Height as of this encounter: 1.829 m (6').    Weight as of this encounter: 92.3 kg (203 lb 7.8 oz).       Follow-ups Needed After Discharge   Follow-up Appointments     Follow-up and recommended labs and tests       Follow up with primary care provider, Tammy Boyle, within 7 days for   hospital follow- up.  The following labs/tests are recommended: Chest   x-ray for follow-up.        {Additional follow-up instructions/to-do's for PCP    :    Unresulted Labs Ordered in the Past 30 Days of this Admission       No orders found from 3/12/2024 to 4/12/2024.        These results will be followed up by not applicable    Discharge Disposition   Discharged to home  Condition at discharge: Stable    Hospital Course   Bernard Sanz is a 84 year old male admitted for sudden onset of left-sided chest pain, found to have spontaneous pneumothorax.     Left pneumothorax  -- CT Chest: small-to-moderate-sized left pneumothorax, greatest radial dimension of the pneumothorax is approximately 1.7 cm in the upper left hemithorax.  Follow-up chest x-ray shows reduction in pneumothorax to 1.5 cm.  Discussed with Dr. Hall from pulmonology who recommended discharge and follow-up with primary for repeat chest x-ray in 1 week  -- Pulm consult   - also recommended Z-Beto for acute bronchitis.  Patient completed 2 days of antibiotics in the hospital may need 2 more days to complete a 4-day course.      L sided chest pain   Suspect secondary to the above  -- Trop flat    -- EKG  reviewed; no acute ischemic changes   -- DC telemetry monitor    H/o PE, DVT - anticoagulated on Xarelto   -- okay to resume for now given no clear plan for CT     Parkinson disease: Continue home medication regimen    Hypertension: Continue home lisinopril     Consultations This Hospital Stay   PULMONARY IP CONSULT  PULMONARY IP CONSULT  CARE MANAGEMENT / SOCIAL WORK IP CONSULT    Code Status   Full Code    Time Spent on this Encounter   ISree MD, personally saw the patient today and spent greater than 30 minutes discharging this patient.       Sree Carmona MD  [address] [address]  Phone: [phone]  Fax: [phone]  ______________________________________________________________________    Physical Exam   Vital Signs: Temp: 98.2  F (36.8  C) Temp src: Oral BP: (!) 148/96 Pulse: 66   Resp: 18 SpO2: 95 % O2 Device: None (Room air) Oxygen Delivery: 3 LPM  Weight: 203 lbs 7.75 oz  Lungs are clear to auscultation       Primary Care Physician   Tammy Boyle    Discharge Orders      Reason for your hospital stay    Pneumothorax     Follow-up and recommended labs and tests     Follow up with primary care provider, Tammy Boyle, within 7 days for hospital follow- up.  The following labs/tests are recommended: Chest x-ray for follow-up.     Activity    Your activity upon discharge: activity as tolerated.  Avoid air travel until pneumothorax has resolved     Diet    Follow this diet upon discharge: Orders Placed This Encounter      Combination Diet Regular Diet Adult       Significant Results and Procedures   Results for orders placed or performed during the hospital encounter of 04/11/24   XR Chest 2 Views    Narrative    EXAM: XR CHEST 2 VIEWS  LOCATION: [facility]  DATE: 4/11/2024    INDICATION: sob  COMPARISON: CT AP 08/29/2023      Impression    IMPRESSION: Pleural-based curvilinear fibrosis in the left lung  base is unchanged. Right lung is clear. No signs of pneumonia or failure. Heart and pulmonary vascularity are normal.    Marked exaggeration of thoracic kyphosis with anterior wedge compression fractures in the midthoracic spine.   CT Chest Pulmonary Embolism w Contrast    Narrative    EXAM: CT CHEST PULMONARY EMBOLISM W CONTRAST  LOCATION: [facility]  DATE: 4/11/2024    INDICATION: Left chest pain, h/o DVT and PE (has missed his anticoag lately).  COMPARISON: 12/15/2020.  TECHNIQUE: CT chest pulmonary angiogram during arterial phase injection of IV contrast. Multiplanar reformats and MIP reconstructions were performed. Dose reduction techniques were used.   CONTRAST: 90 mL Isovue-370.    FINDINGS: There is some mild loss of detail given patient motion.    ANGIOGRAM CHEST: Pulmonary arteries are normal caliber and negative for pulmonary emboli. Thoracic aorta is negative for dissection. No CT evidence of right heart strain.    LUNGS AND PLEURA: There is a small-to-moderate-sized left pneumothorax. On today's correlative chest radiograph a pneumothorax is present, but not well seen given overlying rib in the upper left hemithorax and atelectasis in the left lung base. The   greatest radial dimension of the pneumothorax is approximately 1.7 cm in the upper left hemithorax. There is some mild bibasilar atelectasis most marked on the left with no central airway obstruction. Tiny left pleural effusion.    MEDIASTINUM/AXILLAE: Minimal findings of gas in the mediastinum.    CORONARY ARTERY CALCIFICATION: Mild.    UPPER ABDOMEN: Tiny esophageal hiatal hernia. The gallbladder is surgically absent. Benign cysts in both kidneys, no follow-up recommended.    MUSCULOSKELETAL: Significant kyphotic curvature of the thoracic spine. No obvious rib fracture identified.      Impression    IMPRESSION:  1.  Approximately 20-25% pneumothorax seen on the left.    2.  Minimal pneumomediastinum.    3.  Mild  bibasilar atelectasis with tiny left pleural effusion.    4.  Tiny esophageal hiatal hernia.    5.  Benign cysts in the kidneys; no follow-up recommended.    6.  No PE, dissection, or aneurysm.    Pneumothorax is a critical finding and this was called to Dr. Holland at approximately 6:09 PM on 04/11/2024 CST.   XR Chest 1 View    Narrative    EXAM: XR CHEST 1 VIEW  LOCATION: [facility]  DATE: 4/12/2024    INDICATION: spontaneous pneumothorax, f u CXR after all night on 100  O2  COMPARISON: 04/11/2024      Impression    IMPRESSION: The heart is enlarged. A small left lobe pneumothorax is again seen, accounting for technical differences similar to prior CT. Atelectasis and volume loss is again noted in the left lower lobe and lingula, similar to prior study.   XR Chest Port 1 View    Narrative    EXAM: XR CHEST PORT 1 VIEW  LOCATION: [facility]  DATE: 4/12/2024    INDICATION: Follow up left side pneumothorax  COMPARISON: 04/12/2024      Impression    IMPRESSION: Apparent mildly improved size of persistent small left pneumothorax could be from differences in positioning. Moderately hypoexpanded lungs. Basilar atelectasis. No new or enlarging pleural effusion. Stable cardiomediastinal silhouette.     XR Chest 1 View    Narrative    EXAM: XR CHEST 1 VIEW  LOCATION: [facility]  DATE: 4/13/2024    INDICATION: Left pneumothorax. Follow-up.  COMPARISON: Portable chest single view 04/12/2024 at 1505 hours.      Impression    IMPRESSION: Small left apical pneumothorax measures 1.5 cm, relatively unchanged. A few strands of platelike atelectasis in the lower lungs, more apparent on the left, improved since prior. No adenopathy or effusion. Mild cardiac enlargement. Normal   pulmonary vascularity. Atherosclerotic thoracic aorta. Degenerative changes both shoulders and the spine. Monitoring leads have been placed overlying the chest. The patient is  rotated.       Discharge Medications   Current Discharge Medication List        START taking these medications    Details   azithromycin (ZITHROMAX) 500 MG tablet Take 1 tablet (500 mg) by mouth daily for 2 days  Qty: 2 tablet, Refills: 0    Associated Diagnoses: Acute bronchitis, unspecified organism           CONTINUE these medications which have NOT CHANGED    Details   carbidopa-levodopa (SINEMET CR)  MG CR tablet 1 tablet 4 times daily 0430, 10AM, 2PM, 6PM  Takes with carbidopa-levodopa 25/100 at same times      carbidopa-levodopa (SINEMET)  MG tablet Take 1 tablet by mouth 4 times daily 0430, 1000, 1400, 1800  Takes at same time as Sinemet CR 50/200      gabapentin (NEURONTIN) 100 MG capsule Take 200 mg by mouth 3 times daily      Lidocaine (LIDOCARE) 4 % Patch Place 1 patch onto the skin every 24 hours To prevent lidocaine toxicity, patient should be patch free for 12 hrs daily.      lisinopril (ZESTRIL) 20 MG tablet Take 20 mg by mouth daily      omeprazole (PRILOSEC) 20 MG DR capsule 20 mg 2 times daily      rivaroxaban ANTICOAGULANT (XARELTO) 20 MG TABS tablet Take 20 mg by mouth daily (with dinner)      simvastatin (ZOCOR) 40 MG tablet Take 40 mg by mouth at bedtime           Allergies   Allergies   Allergen Reactions    Tetracycline

## 2024-04-14 LAB
ATRIAL RATE - MUSE: 75 BPM
DIASTOLIC BLOOD PRESSURE - MUSE: NORMAL MMHG
INTERPRETATION ECG - MUSE: NORMAL
P AXIS - MUSE: 6 DEGREES
PR INTERVAL - MUSE: 212 MS
QRS DURATION - MUSE: 110 MS
QT - MUSE: 400 MS
QTC - MUSE: 446 MS
R AXIS - MUSE: -39 DEGREES
SYSTOLIC BLOOD PRESSURE - MUSE: NORMAL MMHG
T AXIS - MUSE: 78 DEGREES
VENTRICULAR RATE- MUSE: 75 BPM

## 2024-06-09 ENCOUNTER — HEALTH MAINTENANCE LETTER (OUTPATIENT)
Age: 84
End: 2024-06-09

## 2024-09-25 ENCOUNTER — HOSPITAL ENCOUNTER (INPATIENT)
Facility: HOSPITAL | Age: 84
LOS: 5 days | Discharge: HOME | DRG: 378 | End: 2024-09-30
Attending: EMERGENCY MEDICINE | Admitting: STUDENT IN AN ORGANIZED HEALTH CARE EDUCATION/TRAINING PROGRAM
Payer: COMMERCIAL

## 2024-09-25 ENCOUNTER — APPOINTMENT (OUTPATIENT)
Dept: CT IMAGING | Facility: HOSPITAL | Age: 84
DRG: 378 | End: 2024-09-25
Attending: EMERGENCY MEDICINE
Payer: COMMERCIAL

## 2024-09-25 DIAGNOSIS — Z86.69 HISTORY OF PARKINSON'S DISEASE: ICD-10-CM

## 2024-09-25 DIAGNOSIS — Z79.01 ANTICOAGULATED: ICD-10-CM

## 2024-09-25 DIAGNOSIS — K92.2 LOWER GI BLEEDING: ICD-10-CM

## 2024-09-25 LAB
ABO/RH(D): NORMAL
ALBUMIN SERPL BCG-MCNC: 4 G/DL (ref 3.5–5.2)
ALP SERPL-CCNC: 91 U/L (ref 40–150)
ALT SERPL W P-5'-P-CCNC: <5 U/L (ref 0–70)
ANION GAP SERPL CALCULATED.3IONS-SCNC: 8 MMOL/L (ref 7–15)
ANTIBODY SCREEN: NEGATIVE
APTT PPP: 35 SECONDS (ref 22–38)
AST SERPL W P-5'-P-CCNC: 19 U/L (ref 0–45)
BASOPHILS # BLD AUTO: 0 10E3/UL (ref 0–0.2)
BASOPHILS NFR BLD AUTO: 0 %
BILIRUB SERPL-MCNC: 0.9 MG/DL
BUN SERPL-MCNC: 21.1 MG/DL (ref 8–23)
CALCIUM SERPL-MCNC: 8.9 MG/DL (ref 8.8–10.4)
CHLORIDE SERPL-SCNC: 106 MMOL/L (ref 98–107)
CREAT SERPL-MCNC: 0.83 MG/DL (ref 0.67–1.17)
EGFRCR SERPLBLD CKD-EPI 2021: 87 ML/MIN/1.73M2
EOSINOPHIL # BLD AUTO: 0.1 10E3/UL (ref 0–0.7)
EOSINOPHIL NFR BLD AUTO: 1 %
ERYTHROCYTE [DISTWIDTH] IN BLOOD BY AUTOMATED COUNT: 14 % (ref 10–15)
GLUCOSE SERPL-MCNC: 103 MG/DL (ref 70–99)
HCO3 SERPL-SCNC: 26 MMOL/L (ref 22–29)
HCT VFR BLD AUTO: 41.9 % (ref 40–53)
HGB BLD-MCNC: 12.2 G/DL (ref 13.3–17.7)
HGB BLD-MCNC: 13.5 G/DL (ref 13.3–17.7)
HOLD SPECIMEN: NORMAL
IMM GRANULOCYTES # BLD: 0 10E3/UL
IMM GRANULOCYTES NFR BLD: 1 %
INR PPP: 1.93 (ref 0.85–1.15)
LYMPHOCYTES # BLD AUTO: 1.7 10E3/UL (ref 0.8–5.3)
LYMPHOCYTES NFR BLD AUTO: 22 %
MCH RBC QN AUTO: 32.4 PG (ref 26.5–33)
MCHC RBC AUTO-ENTMCNC: 32.2 G/DL (ref 31.5–36.5)
MCV RBC AUTO: 101 FL (ref 78–100)
MONOCYTES # BLD AUTO: 0.8 10E3/UL (ref 0–1.3)
MONOCYTES NFR BLD AUTO: 10 %
NEUTROPHILS # BLD AUTO: 5.3 10E3/UL (ref 1.6–8.3)
NEUTROPHILS NFR BLD AUTO: 67 %
NRBC # BLD AUTO: 0 10E3/UL
NRBC BLD AUTO-RTO: 0 /100
PLATELET # BLD AUTO: 239 10E3/UL (ref 150–450)
POTASSIUM SERPL-SCNC: 4.7 MMOL/L (ref 3.4–5.3)
PROT SERPL-MCNC: 6.9 G/DL (ref 6.4–8.3)
RBC # BLD AUTO: 4.17 10E6/UL (ref 4.4–5.9)
SODIUM SERPL-SCNC: 140 MMOL/L (ref 135–145)
SPECIMEN EXPIRATION DATE: NORMAL
WBC # BLD AUTO: 7.9 10E3/UL (ref 4–11)

## 2024-09-25 PROCEDURE — 85610 PROTHROMBIN TIME: CPT | Performed by: EMERGENCY MEDICINE

## 2024-09-25 PROCEDURE — 258N000003 HC RX IP 258 OP 636: Performed by: STUDENT IN AN ORGANIZED HEALTH CARE EDUCATION/TRAINING PROGRAM

## 2024-09-25 PROCEDURE — 74174 CTA ABD&PLVS W/CONTRAST: CPT

## 2024-09-25 PROCEDURE — 85730 THROMBOPLASTIN TIME PARTIAL: CPT | Performed by: EMERGENCY MEDICINE

## 2024-09-25 PROCEDURE — 86900 BLOOD TYPING SEROLOGIC ABO: CPT | Performed by: EMERGENCY MEDICINE

## 2024-09-25 PROCEDURE — 86901 BLOOD TYPING SEROLOGIC RH(D): CPT | Performed by: EMERGENCY MEDICINE

## 2024-09-25 PROCEDURE — 120N000001 HC R&B MED SURG/OB

## 2024-09-25 PROCEDURE — 99222 1ST HOSP IP/OBS MODERATE 55: CPT | Performed by: STUDENT IN AN ORGANIZED HEALTH CARE EDUCATION/TRAINING PROGRAM

## 2024-09-25 PROCEDURE — 250N000011 HC RX IP 250 OP 636: Performed by: EMERGENCY MEDICINE

## 2024-09-25 PROCEDURE — 99285 EMERGENCY DEPT VISIT HI MDM: CPT | Mod: 25

## 2024-09-25 PROCEDURE — 85025 COMPLETE CBC W/AUTO DIFF WBC: CPT | Performed by: EMERGENCY MEDICINE

## 2024-09-25 PROCEDURE — 36415 COLL VENOUS BLD VENIPUNCTURE: CPT | Performed by: EMERGENCY MEDICINE

## 2024-09-25 PROCEDURE — 96360 HYDRATION IV INFUSION INIT: CPT

## 2024-09-25 PROCEDURE — 258N000003 HC RX IP 258 OP 636: Performed by: EMERGENCY MEDICINE

## 2024-09-25 PROCEDURE — 250N000013 HC RX MED GY IP 250 OP 250 PS 637: Performed by: STUDENT IN AN ORGANIZED HEALTH CARE EDUCATION/TRAINING PROGRAM

## 2024-09-25 PROCEDURE — 80053 COMPREHEN METABOLIC PANEL: CPT | Performed by: EMERGENCY MEDICINE

## 2024-09-25 PROCEDURE — 85018 HEMOGLOBIN: CPT | Performed by: STUDENT IN AN ORGANIZED HEALTH CARE EDUCATION/TRAINING PROGRAM

## 2024-09-25 PROCEDURE — G0378 HOSPITAL OBSERVATION PER HR: HCPCS

## 2024-09-25 PROCEDURE — 36415 COLL VENOUS BLD VENIPUNCTURE: CPT | Performed by: STUDENT IN AN ORGANIZED HEALTH CARE EDUCATION/TRAINING PROGRAM

## 2024-09-25 RX ORDER — SODIUM CHLORIDE 9 MG/ML
INJECTION, SOLUTION INTRAVENOUS CONTINUOUS
Status: DISCONTINUED | OUTPATIENT
Start: 2024-09-25 | End: 2024-09-27

## 2024-09-25 RX ORDER — SIMVASTATIN 10 MG
40 TABLET ORAL AT BEDTIME
Status: DISCONTINUED | OUTPATIENT
Start: 2024-09-25 | End: 2024-09-30 | Stop reason: HOSPADM

## 2024-09-25 RX ORDER — PANTOPRAZOLE SODIUM 40 MG/1
40 TABLET, DELAYED RELEASE ORAL 2 TIMES DAILY
Status: DISCONTINUED | OUTPATIENT
Start: 2024-09-25 | End: 2024-09-30 | Stop reason: HOSPADM

## 2024-09-25 RX ORDER — GABAPENTIN 100 MG/1
100 CAPSULE ORAL DAILY PRN
Status: DISCONTINUED | OUTPATIENT
Start: 2024-09-25 | End: 2024-09-30 | Stop reason: HOSPADM

## 2024-09-25 RX ORDER — ACETAMINOPHEN 325 MG/1
650 TABLET ORAL EVERY 4 HOURS PRN
Status: DISCONTINUED | OUTPATIENT
Start: 2024-09-25 | End: 2024-09-30 | Stop reason: HOSPADM

## 2024-09-25 RX ORDER — AMOXICILLIN 250 MG
2 CAPSULE ORAL 2 TIMES DAILY PRN
Status: DISCONTINUED | OUTPATIENT
Start: 2024-09-25 | End: 2024-09-30 | Stop reason: HOSPADM

## 2024-09-25 RX ORDER — CARBIDOPA AND LEVODOPA 50; 200 MG/1; MG/1
1 TABLET, EXTENDED RELEASE ORAL 4 TIMES DAILY
Status: DISCONTINUED | OUTPATIENT
Start: 2024-09-25 | End: 2024-09-30 | Stop reason: HOSPADM

## 2024-09-25 RX ORDER — ACETAMINOPHEN 650 MG/1
650 SUPPOSITORY RECTAL EVERY 4 HOURS PRN
Status: DISCONTINUED | OUTPATIENT
Start: 2024-09-25 | End: 2024-09-30 | Stop reason: HOSPADM

## 2024-09-25 RX ORDER — IOPAMIDOL 755 MG/ML
90 INJECTION, SOLUTION INTRAVASCULAR ONCE
Status: COMPLETED | OUTPATIENT
Start: 2024-09-25 | End: 2024-09-25

## 2024-09-25 RX ORDER — ACETAMINOPHEN 325 MG/1
650 TABLET ORAL EVERY 8 HOURS PRN
COMMUNITY

## 2024-09-25 RX ORDER — CARBIDOPA AND LEVODOPA 25; 100 MG/1; MG/1
1 TABLET ORAL 4 TIMES DAILY
Status: DISCONTINUED | OUTPATIENT
Start: 2024-09-25 | End: 2024-09-30 | Stop reason: HOSPADM

## 2024-09-25 RX ORDER — LISINOPRIL 20 MG/1
20 TABLET ORAL EVERY MORNING
Status: DISCONTINUED | OUTPATIENT
Start: 2024-09-26 | End: 2024-09-30 | Stop reason: HOSPADM

## 2024-09-25 RX ORDER — AMOXICILLIN 250 MG
1 CAPSULE ORAL 2 TIMES DAILY PRN
Status: DISCONTINUED | OUTPATIENT
Start: 2024-09-25 | End: 2024-09-30 | Stop reason: HOSPADM

## 2024-09-25 RX ORDER — ACETAMINOPHEN AND CODEINE PHOSPHATE 300; 15 MG/1; MG/1
1 TABLET ORAL EVERY 6 HOURS PRN
COMMUNITY
Start: 2024-08-30

## 2024-09-25 RX ADMIN — SODIUM CHLORIDE: 9 INJECTION, SOLUTION INTRAVENOUS at 17:26

## 2024-09-25 RX ADMIN — CARBIDOPA AND LEVODOPA 1 TABLET: 25; 100 TABLET ORAL at 16:24

## 2024-09-25 RX ADMIN — SIMVASTATIN 40 MG: 10 TABLET, FILM COATED ORAL at 22:02

## 2024-09-25 RX ADMIN — SODIUM CHLORIDE 500 ML: 9 INJECTION, SOLUTION INTRAVENOUS at 10:00

## 2024-09-25 RX ADMIN — IOPAMIDOL 90 ML: 755 INJECTION, SOLUTION INTRAVENOUS at 11:14

## 2024-09-25 RX ADMIN — PANTOPRAZOLE SODIUM 40 MG: 40 TABLET, DELAYED RELEASE ORAL at 20:16

## 2024-09-25 RX ADMIN — CARBIDOPA AND LEVODOPA 1 TABLET: 50; 200 TABLET, EXTENDED RELEASE ORAL at 16:24

## 2024-09-25 ASSESSMENT — ACTIVITIES OF DAILY LIVING (ADL)
WALKING_OR_CLIMBING_STAIRS_DIFFICULTY: NO
ADLS_ACUITY_SCORE: 38
ADLS_ACUITY_SCORE: 23
ADLS_ACUITY_SCORE: 38
WEAR_GLASSES_OR_BLIND: YES
DIFFICULTY_COMMUNICATING: NO
ADLS_ACUITY_SCORE: 38
DOING_ERRANDS_INDEPENDENTLY_DIFFICULTY: NO
TOILETING_ISSUES: NO
ADLS_ACUITY_SCORE: 38
ADLS_ACUITY_SCORE: 38
HEARING_DIFFICULTY_OR_DEAF: NO
DRESSING/BATHING_DIFFICULTY: NO
ADLS_ACUITY_SCORE: 38
ADLS_ACUITY_SCORE: 38
DEPENDENT_IADLS:: INDEPENDENT
ADLS_ACUITY_SCORE: 23
CONCENTRATING,_REMEMBERING_OR_MAKING_DECISIONS_DIFFICULTY: NO
CHANGE_IN_FUNCTIONAL_STATUS_SINCE_ONSET_OF_CURRENT_ILLNESS/INJURY: NO
ADLS_ACUITY_SCORE: 38
ADLS_ACUITY_SCORE: 38
DIFFICULTY_EATING/SWALLOWING: NO
ADLS_ACUITY_SCORE: 38
FALL_HISTORY_WITHIN_LAST_SIX_MONTHS: NO
ADLS_ACUITY_SCORE: 38
ADLS_ACUITY_SCORE: 23

## 2024-09-25 ASSESSMENT — COLUMBIA-SUICIDE SEVERITY RATING SCALE - C-SSRS
6. HAVE YOU EVER DONE ANYTHING, STARTED TO DO ANYTHING, OR PREPARED TO DO ANYTHING TO END YOUR LIFE?: NO
2. HAVE YOU ACTUALLY HAD ANY THOUGHTS OF KILLING YOURSELF IN THE PAST MONTH?: NO
1. IN THE PAST MONTH, HAVE YOU WISHED YOU WERE DEAD OR WISHED YOU COULD GO TO SLEEP AND NOT WAKE UP?: NO

## 2024-09-25 NOTE — CONSULTS
Care Management Initial Consult    General Information  Assessment completed with: Patient,    Type of CM/SW Visit: Initial Assessment    Primary Care Provider verified and updated as needed: Yes   Readmission within the last 30 days: no previous admission in last 30 days         Advance Care Planning: Advance Care Planning Reviewed:  (no HCD)          Communication Assessment  Patient's communication style: spoken language (English or Bilingual)             Cognitive  Cognitive/Neuro/Behavioral: WDL                      Living Environment:   People in home: spouse     Current living Arrangements: house      Able to return to prior arrangements: yes       Family/Social Support:  Care provided by: self  Provides care for: no one  Marital Status:   Support system: Wife, Children          Description of Support System: Supportive, Involved         Current Resources:   Patient receiving home care services: No        Community Resources: None  Equipment currently used at home: none  Supplies currently used at home: None    Employment/Financial:  Employment Status: retired        Financial Concerns:             Does the patient's insurance plan have a 3 day qualifying hospital stay waiver?  Yes     Which insurance plan 3 day waiver is available? Alternative insurance waiver    Will the waiver be used for post-acute placement? Undetermined at this time    Lifestyle & Psychosocial Needs:  Social Determinants of Health     Food Insecurity: No Food Insecurity (10/26/2023)    Received from [facility]    Food Insecurity     Worried About Running Out of Food in the Last Year: 1   Depression: Not at risk (4/17/2024)    Received from [facility]    PHQ-2     PHQ-2 TOTAL SCORE: 2   Housing Stability: Low Risk  (10/26/2023)    Received from [facility]    Housing Stability     Unable to Pay for Housing in the Last Year: 1    Tobacco Use: Medium Risk (6/26/2024)    Received from [facility]    Patient History     Smoking Tobacco Use: Former     Smokeless Tobacco Use: Never     Passive Exposure: Not on file   Financial Resource Strain: Low Risk  (10/26/2023)    Received from [facility]    Financial Resource Strain     Difficulty of Paying Living Expenses: 3     Difficulty of Paying Living Expenses: Not on file   Alcohol Use: Not on file   Transportation Needs: No Transportation Needs (10/26/2023)    Received from [facility]    Transportation Needs     Lack of Transportation (Medical): 1   Physical Activity: Not on file   Interpersonal Safety: Not on file   Stress: Not on file   Social Connections: Socially Integrated (10/26/2023)    Received from [facility]    Social Connections     Frequency of Communication with Friends and Family: 0   Health Literacy: Not on file       Functional Status:  Prior to admission patient needed assistance:   Dependent ADLs:: Independent  Dependent IADLs:: Independent                Discussed  Partnership in Safe Discharge Planning  document with patient/family: No    Additional Information:    Assessment completed with patient. Patient reports he lives in his house with spouse. He is independent with ADLs/IADLs, ambulates without devices and has no services in community. Spouse is primary family contact. Family willing to transport at discharge.        Dominga Maier RN

## 2024-09-25 NOTE — H&P
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  Patient admitted on 9/25/2024 for lower GI bleed for observation    Lower GI bleed,   Suspected diverticular bleed  BRBPR  - Patient presented with 1 episode of maroon bloody stool this morning, no prior episodes.  No history of hemorrhoid  - Patient has been hemodynamically stable with no further episodes of bleeding since presentation to the ER  - Hb 13.5, was 14.2 04/24  - CT abdomen pelvis shows moderate iatrogenic atheromatous plaque but no aneurysm.  No findings to suggest acute GI hemorrhage. Descending and sigmoid diverticulosis, no diverticulitis  - S/p 500 mL NS bolus in ER  - clear liquid diet  - Hold Xarelto  - Monitor Hb q12, as no further episodes of bleeding  - If rebleeds, will keep NPO pm and consult GI    Addendum:   - RN paged 4.15pm, patient had large amount of dark cortez blood per rectum, some very small clots present  - Monitor Hb q6  - Gentle IV fluids  - CLD, Consult GI  - NPO pm    History of PE  Drug-induced coagulopathy  - INR 1.93  - Hold Xarelto    Parkinson's disease  PTA Sinemet    HTN  - PTA Lisinopril 20mg  - Monitor BP    GERD  - PPI    HLD  - PTA statin         Observation Goals: -diagnostic tests and consults completed and resulted, -vital signs normal or at patient baseline, Nurse to notify provider when observation goals have been met and patient is ready for discharge.  Diet: Full Liquid Diet    DVT Prophylaxis: Pneumatic Compression Devices  Novak Catheter: Not present  Lines: None     Cardiac Monitoring: None  Code Status: Full Code    Clinically Significant Risk Factors Present on Admission               # Drug Induced Coagulation Defect: home medication list includes an anticoagulant medication    #  Hypertension: Noted on problem list         # Overweight: Estimated body mass index is 25.09 kg/m  as calculated from the following:    Height as of this encounter: 1.829 m (6').    Weight as of this encounter: 83.9 kg (185 lb).         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  Patient admitted on 9/25/2024 for lower GI bleed for observation  Patient presented with maroon bloody stool x 1 this morning, wife has a picture.  Has been on Xarelto for PE for the past 5 years, no evidence of recent PE, denies prior GI bleed, denies history of hemorrhoids.  Denies cp, sob, abdominal pain, change in bowel habits, weight changes, lightheadedness. Patient hemodynamically stable. CTA with no evidence of acute GI hemorrhage.  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    DJD tarsometatarsal joint right foot    DVT (deep venous thrombosis) (H) 10/2013    left leg with progression to pulmonary emboli    Erectile dysfunction     GERD (gastroesophageal reflux disease)     Hearing loss of both ears     High cholesterol     Per pt conversation    HTN (hypertension)      Hypercholesteremia     Hypertension     Kidney stone     Kidney stones 10/16/2013    Parkinson disease (H) 10/26/2015    Parkinson's disease (H)     Plantar fasciitis of right foot 10/2019    Prostate cancer (H) 10/16/2013    Pulmonary emboli (H)        Past Surgical History   Past Surgical History:   Procedure Laterality Date    APPENDECTOMY      Per pt conversation    Musa's esophagus s/p ablation      CHOLECYSTECTOMY      CHOLECYSTECTOMY      Per pt conversation    IR PULMONARY ANGIOGRAM BILATERAL  12/15/2020    IR PULMONARY ANGIOGRAM BILATERAL  12/15/2020    PROSTATECTOMY      Per pt conversation    TONSILLECTOMY      Per pt conversation       Prior to Admission Medications   Prior to Admission Medications   Prescriptions Last Dose Informant Patient Reported? Taking?   acetaminophen (TYLENOL) 325 MG tablet Past Week at prn  Yes Yes   Sig: Take 650 mg by mouth every 8 hours as needed for mild pain.   acetaminophen-codeine (TYLENOL #2) 300-15 MG per tablet Past Week at prn  Yes Yes   Sig: Take 1 tablet by mouth every 6 hours as needed.   carbidopa-levodopa (SINEMET CR)  MG CR tablet 2024 at 1000 (2nd dose)  Yes Yes   Si tablet 4 times daily 0430, 10AM, 2PM, 6PM  Takes with carbidopa-levodopa 25/100 at same times   carbidopa-levodopa (SINEMET)  MG tablet 2024 at 1000 (2nd dose)  Yes Yes   Sig: Take 1 tablet by mouth 4 times daily 0430, 1000, 1400, 1800  Takes at same time as Sinemet CR 50/200   gabapentin (NEURONTIN) 100 MG capsule Past Week at prn  Yes Yes   Sig: Take 100 mg by mouth daily as needed.   lisinopril (ZESTRIL) 20 MG tablet 2024 at am  Yes Yes   Sig: Take 20 mg by mouth every morning.   omeprazole (PRILOSEC) 20 MG DR capsule 2024 at am  Yes Yes   Sig: Take 20 mg by mouth 2 times daily.   rivaroxaban ANTICOAGULANT (XARELTO) 20 MG TABS tablet 2024 at hs  Yes Yes   Sig: Take 20 mg by mouth at bedtime.   simvastatin (ZOCOR) 40 MG tablet 2024 at hs  Yes  Yes   Sig: Take 40 mg by mouth at bedtime      Facility-Administered Medications: None        Review of Systems    The 10 point Review of Systems is negative other than noted in the HPI or here.      Physical Exam   Vital Signs: Temp: 98  F (36.7  C)   BP: 124/66 Pulse: 79   Resp: 24 SpO2: 97 %      Weight: 185 lbs 0 oz    General:  Appears stated age, no acute distress. A&O x 3.  Skin:  Warm, dry  Chest:  Breath sounds CTA and no increased work of breathing on room air.No rales or wheezes  Cardiovascular:  RRR, no rub or murmur. No peripheral edema.  Abdomen:  Soft, non-tender, non-distended.  Musculoskeletal:  Moves all four extremities. No muscle atrophy.  Neurological: chronic tremor to right arm and leg from Parkinson's disease, otherwise no gross focal deficit    Medical Decision Making       60 MINUTES SPENT BY ME on the date of service doing chart review, history, exam, documentation & further activities per the note.      Data     I have personally reviewed the following data over the past 24 hrs:    7.9  \   13.5   / 239     140 106 21.1 /  103 (H)   4.7 26 0.83 \     ALT: <5 AST: 19 AP: 91 TBILI: 0.9   ALB: 4.0 TOT PROTEIN: 6.9 LIPASE: N/A     INR:  1.93 (H) PTT:  35   D-dimer:  N/A Fibrinogen:  N/A       Imaging results reviewed over the past 24 hrs:   Recent Results (from the past 24 hour(s))   CTA Abdomen Pelvis with Contrast    Narrative    EXAM: CTA ABDOMEN PELVIS WITH CONTRAST  LOCATION: [facility]  DATE: 9/25/2024    INDICATION: GI bleed on Xarelto  COMPARISON: CT abdomen pelvis without contrast 8/29/2023  TECHNIQUE: CT angiogram abdomen pelvis during arterial phase of injection of IV contrast. 2D and 3D MIP reconstructions were performed by the CT technologist. Dose reduction techniques were used.  CONTRAST: IsoVue 370 90mL    FINDINGS:  ANGIOGRAM ABDOMEN/PELVIS: There is moderate, patchy calcified plaque throughout the descending thoracic aorta, abdominal aorta, and the  iliac arteries. No abdominal aortic aneurysm or critical stenosis. The celiac axis, SMA and their named proximal   branches are widely patent. Patent HENRY. Single bilateral renal arteries have mild proximal calcified plaque but no flow limiting narrowing.    No endoluminal blush or contrast accumulation within the bowel on arterial or portal venous phase acquisitions to suggest a point source of acute gastrointestinal hemorrhage.    LOWER CHEST: Subsegmental territories of atelectasis in the bases. Small hiatal hernia. Minimal calcification of the aortic root.    HEPATOBILIARY: Post cholecystectomy. No bile duct enlargement. Liver is normal in size with smooth capsule. No liver lesions.    PANCREAS: Normal.    SPLEEN: Normal.    ADRENAL GLANDS: Normal.    KIDNEYS/BLADDER: No significant mass, stones, or hydronephrosis. There are simple or benign cysts. No follow up is needed.    BOWEL: There are multiple diverticula arising from the descending and sigmoid colon. No diverticular or bowel wall thickening. No dilated bowel or focal bowel caliber transition. No inflammatory stranding in the mesentery. No peritoneal thickening or   ascites.    LYMPH NODES: Normal.    PELVIC ORGANS: Status post prostatectomy. No pelvic mass or free fluid.    MUSCULOSKELETAL: Status post left inguinal hernia repair. Multilevel low thoracic and lumbar disc space narrowing and marginal osteophytes. Vacuum disc phenomenon are present at L1-L2, L4-5 and L5-S1.      Impression    IMPRESSION:    1.  Moderate aortoiliac atheromatous plaque but no aneurysm. No acute abnormalities of the mesenteric arteries.  2.  No findings to suggest point source of acute gastrointestinal hemorrhage.  3.  Descending and sigmoid diverticulosis but no diverticulitis.

## 2024-09-25 NOTE — ED TRIAGE NOTES
Pt takes xarelto for Pe's. Pt had large maroon BM this am.      Triage Assessment (Adult)       Row Name 09/25/24 0934          Triage Assessment    Airway WDL WDL        Respiratory WDL    Respiratory WDL WDL        Skin Circulation/Temperature WDL    Skin Circulation/Temperature WDL WDL        Cardiac WDL    Cardiac WDL WDL        Peripheral/Neurovascular WDL    Peripheral Neurovascular WDL WDL        Cognitive/Neuro/Behavioral WDL    Cognitive/Neuro/Behavioral WDL WDL

## 2024-09-25 NOTE — MEDICATION SCRIBE - ADMISSION MEDICATION HISTORY
Medication Scribe Admission Medication History    Admission medication history is complete. The information provided in this note is only as accurate as the sources available at the time of the update.    Information Source(s): Family member via in-person    Pertinent Information: Patient's medications are being managed by spouse, Elham. Elham ([phone]) was present at bedside    Patient reported to be no longer taking: Lidocaine patch    Changes made to PTA medication list:  Added: Tylenol, Tylenol #2  Deleted: Lidocaine patch  Changed: Gabapentin to 100 PRN (per patient)    Allergies reviewed with patient and updates made in EHR: yes    Medication History Completed By: Alen Forrest 9/25/2024 2:16 PM    PTA Med List   Medication Sig Last Dose    acetaminophen (TYLENOL) 325 MG tablet Take 650 mg by mouth every 8 hours as needed for mild pain. Past Week at prn    acetaminophen-codeine (TYLENOL #2) 300-15 MG per tablet Take 1 tablet by mouth every 6 hours as needed. Past Week at prn    carbidopa-levodopa (SINEMET CR)  MG CR tablet 1 tablet 4 times daily 0430, 10AM, 2PM, 6PM  Takes with carbidopa-levodopa 25/100 at same times 9/25/2024 at 1000 (2nd dose)    carbidopa-levodopa (SINEMET)  MG tablet Take 1 tablet by mouth 4 times daily 0430, 1000, 1400, 1800  Takes at same time as Sinemet CR 50/200 9/25/2024 at 1000 (2nd dose)    gabapentin (NEURONTIN) 100 MG capsule Take 100 mg by mouth daily as needed. Past Week at prn    lisinopril (ZESTRIL) 20 MG tablet Take 20 mg by mouth every morning. 9/25/2024 at am    omeprazole (PRILOSEC) 20 MG DR capsule Take 20 mg by mouth 2 times daily. 9/25/2024 at am    rivaroxaban ANTICOAGULANT (XARELTO) 20 MG TABS tablet Take 20 mg by mouth at bedtime. 9/24/2024 at hs    simvastatin (ZOCOR) 40 MG tablet Take 40 mg by mouth at bedtime 9/24/2024 at hs

## 2024-09-25 NOTE — PLAN OF CARE
Goal Outcome Evaluation:      Plan of Care Reviewed With: patient    Overall Patient Progress: no change    Patient alert and oriented x4. Patient has not had any bloody stools since arrival from the ED. Patient on a clear liquid diet. Patient will be NPO at midnight for procedure tomorrow. Patient is now inpatient. Patient has NS running at 60ml/hr.

## 2024-09-25 NOTE — ED NOTES
Patient complaining of discomfort at IV site. There is a small area of infiltration. Bolus was stopped and left AC line was discontinued.

## 2024-09-25 NOTE — ED PROVIDER NOTES
Emergency Department Encounter     Evaluation Date & Time:   9/25/2024  9:43 AM    CHIEF COMPLAINT:  Rectal Bleeding (X 1 this am/large amount)      Triage Note:Pt takes xarelto for Pe's. Pt had large maroon BM this am.         ED COURSE & MEDICAL DECISION MAKING:     Pt here with bloody stool x 1 this morning.  Pt's wife showed me a photo of it.  Pt has been on Xarelto for PE for the past 5 years, no recent PE.  Pt denies any sort of abdominal pain or previous history of GI bleeding. Pt denies cp/sob, lightheadedness.  Will get labs, CTA abd/pelvis, reassess. Pt hemodynamically stable.  No PPI ordered as history consistent with lower GI bleed.    ED Course as of 09/25/24 1550   Wed Sep 25, 2024   1215 Pt updated on all results.  Will hospitalize overnight for monitoring given anticoagulation use and new bloody stool.  No recurrent episodes.      Workup reassuring with stable Hgb at this time, neg CTA.  Pt ultimately agreeable to hospitalization.   1252 Pt accepted by hospitalist for med/surg obs.   1259 Radiology called to inform me of additional read on CT with large scrotal hydrocele.  Pt has no scrotal pain or symptoms to warrant emergent US or scrotum.         Medical Decision Making    History:  Supplemental history from: Family Member/Significant Other  External Record(s) reviewed: Documented in chart    Work Up:  Chart documentation includes differential considered and any EKGs or imaging independently interpreted by provider, where specified.  In additional to work up documented, I considered the following work up: Documented in chart, if applicable.    External consultation:  Discussion of management with another provider: Documented in chart, if applicable    Complicating factors:  Care impacted by chronic illness: Anticoagulated State and Other: Parkinson  Care affected by social determinants of health: N/A    Disposition considerations: Admit.    Not Applicable     At the conclusion of the encounter I  discussed the results of all the tests and the disposition. The questions were answered. The patient or family acknowledged understanding and was agreeable with the care plan.      MEDICATIONS GIVEN IN THE EMERGENCY DEPARTMENT:  Medications   carbidopa-levodopa (SINEMET CR)  MG per CR tablet 1 tablet (has no administration in time range)   carbidopa-levodopa (SINEMET)  MG per tablet 1 tablet (has no administration in time range)   gabapentin (NEURONTIN) capsule 100 mg (has no administration in time range)   lisinopril (ZESTRIL) tablet 20 mg (has no administration in time range)   rivaroxaban ANTICOAGULANT (XARELTO) tablet 20 mg ( Oral Automatically Held 9/28/24 2200)   simvastatin (ZOCOR) tablet 40 mg (has no administration in time range)   senna-docusate (SENOKOT-S/PERICOLACE) 8.6-50 MG per tablet 1 tablet (has no administration in time range)     Or   senna-docusate (SENOKOT-S/PERICOLACE) 8.6-50 MG per tablet 2 tablet (has no administration in time range)   acetaminophen (TYLENOL) tablet 650 mg (has no administration in time range)     Or   acetaminophen (TYLENOL) Suppository 650 mg (has no administration in time range)   pantoprazole (PROTONIX) EC tablet 40 mg (has no administration in time range)   sodium chloride 0.9% BOLUS 500 mL (0 mLs Intravenous Stopped 9/25/24 1115)   iopamidol (ISOVUE-370) solution 90 mL (90 mLs Intravenous $Given 9/25/24 1114)       NEW PRESCRIPTIONS STARTED AT TODAY'S ED VISIT:  New Prescriptions    No medications on file       [facility]   HPI     Bernard Sanz is a 84 year old male with a pertinent history of anticoagulation on Xarelto for previous, remote PE who presents to this ED via walk-in with wife for evaluation of large, maroon colored stool this morning x 1.  Denies abdominal pain or additional bleeding.  Pt has never had a GI bleed before.  Pt denies cp/sob, lightheadedness, recent medication changes. No treatment at home.      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    DVT (deep venous thrombosis) (H) 10/2013    Erectile dysfunction     GERD (gastroesophageal reflux disease)     Hearing loss of both ears     High cholesterol     HTN (hypertension)     Hypercholesteremia     Hypertension     Kidney stone     Kidney stones 10/16/2013    Parkinson disease (H) 10/26/2015    Parkinson's disease (H)     Plantar fasciitis of right foot 10/2019    Prostate cancer (H) 10/16/2013    Pulmonary emboli (H) 2013       Past Surgical History:   Procedure Laterality Date    APPENDECTOMY      Per pt conversation    Musa's esophagus s/p ablation      CHOLECYSTECTOMY      CHOLECYSTECTOMY      Per pt conversation    IR PULMONARY ANGIOGRAM BILATERAL  12/15/2020    IR PULMONARY ANGIOGRAM BILATERAL  12/15/2020    PROSTATECTOMY      Per pt conversation    TONSILLECTOMY      Per pt conversation       Family History   Problem Relation Age of Onset    Cancer Father         throat    Diabetes Maternal Grandfather     Urolithiasis No family hx of     Clotting Disorder No family hx of     Gout No family hx of     Heart Disease No family hx of        Social History     Tobacco Use    Smoking status: Former    Smokeless tobacco: Never   Substance Use Topics    Alcohol use: Yes     Comment: Alcoholic Drinks/day: 3x per week       acetaminophen (TYLENOL) 325 MG tablet  acetaminophen-codeine (TYLENOL #2) 300-15 MG per tablet  carbidopa-levodopa (SINEMET CR)  MG CR tablet  carbidopa-levodopa (SINEMET)  MG tablet  gabapentin (NEURONTIN) 100 MG capsule  lisinopril (ZESTRIL) 20 MG tablet  omeprazole (PRILOSEC) 20 MG DR capsule  rivaroxaban ANTICOAGULANT (XARELTO) 20 MG TABS tablet  simvastatin (ZOCOR) 40 MG tablet        Physical Exam      Vitals:  /66   Pulse 79   Temp 98  F (36.7  C)   Resp 24   Ht 1.829 m (6')   Wt 83.9 kg (185 lb)   SpO2 97%   BMI 25.09 kg/m      PHYSICAL EXAM:   Physical Exam  Vitals and nursing note reviewed.   Constitutional:       General: He is not in acute distress.     Appearance: Normal appearance.   HENT:      Head: Normocephalic and atraumatic.      Nose: Nose normal.      Mouth/Throat:      Mouth: Mucous membranes are moist.   Eyes:      Pupils: Pupils are equal, round, and reactive to light.   Cardiovascular:      Rate and Rhythm: Normal rate and regular rhythm.      Pulses: Normal pulses.           Radial pulses are 2+ on the right side and 2+ on the left side.        Dorsalis pedis pulses are 2+ on the right side and 2+ on the left side.   Pulmonary:      Effort: Pulmonary effort is normal. No respiratory distress.      Breath sounds: Normal breath sounds.   Abdominal:      Palpations: Abdomen is soft.      Tenderness: There is no abdominal tenderness.   Musculoskeletal:      Cervical back: Full passive range of motion without pain and neck supple.      Comments: No calf tenderness or swelling b/l   Skin:     General: Skin is warm.      Findings: No rash.   Neurological:      General: No focal deficit present.      Mental Status: He is alert. Mental status is at baseline.      Comments: Fluent speech, no acute lateralizing deficits  Chronic tremor to right arm and leg from Parkinson   Psychiatric:         Mood and Affect: Mood normal.         Behavior: Behavior normal.           Results     LAB:  All pertinent labs reviewed and interpreted  Labs Ordered and Resulted from Time of ED Arrival to Time of ED Departure   INR - Abnormal       Result Value    INR 1.93 (*)    COMPREHENSIVE METABOLIC PANEL - Abnormal    Sodium 140      Potassium 4.7      Carbon Dioxide (CO2) 26      Anion Gap 8      Urea Nitrogen 21.1      Creatinine 0.83      GFR Estimate 87      Calcium 8.9      Chloride 106      Glucose 103  (*)     Alkaline Phosphatase 91      AST 19      ALT <5      Protein Total 6.9      Albumin 4.0      Bilirubin Total 0.9     CBC WITH PLATELETS AND DIFFERENTIAL - Abnormal    WBC Count 7.9      RBC Count 4.17 (*)     Hemoglobin 13.5      Hematocrit 41.9       (*)     MCH 32.4      MCHC 32.2      RDW 14.0      Platelet Count 239      % Neutrophils 67      % Lymphocytes 22      % Monocytes 10      % Eosinophils 1      % Basophils 0      % Immature Granulocytes 1      NRBCs per 100 WBC 0      Absolute Neutrophils 5.3      Absolute Lymphocytes 1.7      Absolute Monocytes 0.8      Absolute Eosinophils 0.1      Absolute Basophils 0.0      Absolute Immature Granulocytes 0.0      Absolute NRBCs 0.0     PARTIAL THROMBOPLASTIN TIME - Normal    aPTT 35     HEMOGLOBIN   TYPE AND SCREEN, ADULT    ABO/RH(D) AB POS      Antibody Screen Negative      SPECIMEN EXPIRATION DATE [id number]     ABO/RH TYPE AND SCREEN       RADIOLOGY:  CTA Abdomen Pelvis with Contrast   Final Result   IMPRESSION:      1.  Moderate aortoiliac atheromatous plaque but no aneurysm. No acute abnormalities of the mesenteric arteries.   2.  No findings to suggest point source of acute gastrointestinal hemorrhage.   3.  Descending and sigmoid diverticulosis but no diverticulitis.                   ECG:  none    PROCEDURES:  Procedures:  none      FINAL IMPRESSION:    ICD-10-CM    1. Lower GI bleeding  K92.2       2. Anticoagulated  Z79.01       3. History of Parkinson's disease  Z86.69           0 minutes of critical care time        Amos Alexandre DO  Emergency Medicine  [facility] EMERGENCY DEPARTMENT  9/25/2024  9:59 AM          Amos Alexandre MD  09/25/24 0400

## 2024-09-25 NOTE — ED NOTES
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    DJD tarsometatarsal joint right foot    DVT (deep venous thrombosis) (H) 10/2013    left leg with progression to pulmonary emboli    Erectile dysfunction     GERD (gastroesophageal reflux disease)     Hearing loss of both ears     High cholesterol     Per pt conversation    HTN (hypertension)     Hypercholesteremia     Hypertension     Kidney stone     Kidney stones 10/16/2013    Parkinson disease (H) 10/26/2015    Parkinson's disease (H)     Plantar fasciitis of right foot 10/2019    Prostate cancer (H) 10/16/2013    Pulmonary emboli (H) 2013        Code Status:  Full Code     Falls Risk: Yes Band: Applied    Current Living Situation/Residence: lives with a significant other     Elimination Status: Continent: Yes     Activity Level: SBA/ 1 assist    Patients Preferred Language:  English     Needed: No    Vital Signs:  /66   Pulse 79   Temp 98  F (36.7  C)   Resp 24   Ht 1.829 m (6')   Wt 83.9 kg (185 lb)   SpO2 97%   BMI 25.09 kg/m       Cardiac Rhythm: n/a    Pain Score: 0/10    Is the Patient Confused:  No    Last Food or Drink: 09/25/24    Focused Assessment:  patient presents to ED for rectal bleeding at home this morning. Patient had a soft but formed brown stool. During the bowel movement he passed a moderate to large amount of  dark red blood and small clots. He denies any recent history of constipation or diarrhea. No abdominal pain, abdomen is non-tender to palpation. Patient denies dizziness or weakness. At 1610,  patient passed a large amount of dark red blood per rectum. Dr. Taylor was updated about re-bleed per orders.    Tests Performed: Done: Labs and Imaging    Treatments Provided:  500 ml NS bolus    Family Dynamics/Concerns: No    Family Updated On Visitor Policy: Yes    Plan of Care Communicated to Family: Yes    Who Was Updated about Plan of Care: patients wife    Belongings Checklist Done and Signed by Patient: Yes    Belongings Sent with Patient: clothing, blue zip up sweatshirt, ball cap, shoes, glasses - patient's wife taking his valuables home    Medications sent with patient: n/a    Covid: asymptomatic , not tested      RN: Michelle ABEL 9/25/2024 3:45 PM

## 2024-09-25 NOTE — PROGRESS NOTES
Patient admitted to room 15 at approximately 1707 via wheel chair from emergency room.  Reason for Admission: Rectal bleeding  Report received from: No one - note in chart  Patient was accompanied by Spouse.  Discharge transportation provided by:  Patient ambulated/transferred:  . self.  Patient is alert and orientated x 4.  Outpatient Observation education provided to: (patient, family, friend)  MDRO Education done if applicable (MRSA, VRE, etc)  Safety risks were identified during admission:  fall.   Yellow risk/fall band applied:  Yes  Home meds sent home: No  Home meds sent to pharmacy:No   Detailed Belongings:  white t shirt - gray pants blue hoodie, white and black tennis shoes glasses

## 2024-09-26 ENCOUNTER — ANESTHESIA EVENT (OUTPATIENT)
Dept: SURGERY | Facility: HOSPITAL | Age: 84
DRG: 378 | End: 2024-09-26
Payer: COMMERCIAL

## 2024-09-26 LAB
ANION GAP SERPL CALCULATED.3IONS-SCNC: 7 MMOL/L (ref 7–15)
BUN SERPL-MCNC: 16.5 MG/DL (ref 8–23)
CALCIUM SERPL-MCNC: 8.4 MG/DL (ref 8.8–10.4)
CHLORIDE SERPL-SCNC: 110 MMOL/L (ref 98–107)
CREAT SERPL-MCNC: 0.73 MG/DL (ref 0.67–1.17)
EGFRCR SERPLBLD CKD-EPI 2021: >90 ML/MIN/1.73M2
ERYTHROCYTE [DISTWIDTH] IN BLOOD BY AUTOMATED COUNT: 13.9 % (ref 10–15)
GLUCOSE SERPL-MCNC: 92 MG/DL (ref 70–99)
HCO3 SERPL-SCNC: 24 MMOL/L (ref 22–29)
HCT VFR BLD AUTO: 33.5 % (ref 40–53)
HGB BLD-MCNC: 10.8 G/DL (ref 13.3–17.7)
HGB BLD-MCNC: 11.1 G/DL (ref 13.3–17.7)
HGB BLD-MCNC: 11.2 G/DL (ref 13.3–17.7)
HGB BLD-MCNC: 9.1 G/DL (ref 13.3–17.7)
MCH RBC QN AUTO: 33 PG (ref 26.5–33)
MCHC RBC AUTO-ENTMCNC: 33.1 G/DL (ref 31.5–36.5)
MCV RBC AUTO: 100 FL (ref 78–100)
PLATELET # BLD AUTO: 195 10E3/UL (ref 150–450)
POTASSIUM SERPL-SCNC: 4.3 MMOL/L (ref 3.4–5.3)
RBC # BLD AUTO: 3.36 10E6/UL (ref 4.4–5.9)
SODIUM SERPL-SCNC: 141 MMOL/L (ref 135–145)
WBC # BLD AUTO: 7.3 10E3/UL (ref 4–11)

## 2024-09-26 PROCEDURE — 36415 COLL VENOUS BLD VENIPUNCTURE: CPT | Performed by: STUDENT IN AN ORGANIZED HEALTH CARE EDUCATION/TRAINING PROGRAM

## 2024-09-26 PROCEDURE — 85018 HEMOGLOBIN: CPT | Performed by: STUDENT IN AN ORGANIZED HEALTH CARE EDUCATION/TRAINING PROGRAM

## 2024-09-26 PROCEDURE — 250N000013 HC RX MED GY IP 250 OP 250 PS 637: Performed by: PHYSICIAN ASSISTANT

## 2024-09-26 PROCEDURE — 258N000003 HC RX IP 258 OP 636: Performed by: STUDENT IN AN ORGANIZED HEALTH CARE EDUCATION/TRAINING PROGRAM

## 2024-09-26 PROCEDURE — 250N000013 HC RX MED GY IP 250 OP 250 PS 637: Performed by: STUDENT IN AN ORGANIZED HEALTH CARE EDUCATION/TRAINING PROGRAM

## 2024-09-26 PROCEDURE — 80048 BASIC METABOLIC PNL TOTAL CA: CPT | Performed by: STUDENT IN AN ORGANIZED HEALTH CARE EDUCATION/TRAINING PROGRAM

## 2024-09-26 PROCEDURE — 99233 SBSQ HOSP IP/OBS HIGH 50: CPT | Performed by: EMERGENCY MEDICINE

## 2024-09-26 PROCEDURE — 120N000001 HC R&B MED SURG/OB

## 2024-09-26 RX ORDER — POLYETHYLENE GLYCOL 3350 17 G/17G
238 POWDER, FOR SOLUTION ORAL ONCE
Status: COMPLETED | OUTPATIENT
Start: 2024-09-26 | End: 2024-09-26

## 2024-09-26 RX ORDER — BISACODYL 5 MG
10 TABLET, DELAYED RELEASE (ENTERIC COATED) ORAL ONCE
Status: COMPLETED | OUTPATIENT
Start: 2024-09-26 | End: 2024-09-26

## 2024-09-26 RX ORDER — MAGNESIUM CARB/ALUMINUM HYDROX 105-160MG
296 TABLET,CHEWABLE ORAL ONCE
Status: COMPLETED | OUTPATIENT
Start: 2024-09-27 | End: 2024-09-27

## 2024-09-26 RX ADMIN — CARBIDOPA AND LEVODOPA 1 TABLET: 50; 200 TABLET, EXTENDED RELEASE ORAL at 04:10

## 2024-09-26 RX ADMIN — CARBIDOPA AND LEVODOPA 1 TABLET: 25; 100 TABLET ORAL at 10:21

## 2024-09-26 RX ADMIN — BISACODYL 10 MG: 5 TABLET, COATED ORAL at 10:24

## 2024-09-26 RX ADMIN — PANTOPRAZOLE SODIUM 40 MG: 40 TABLET, DELAYED RELEASE ORAL at 08:41

## 2024-09-26 RX ADMIN — CARBIDOPA AND LEVODOPA 1 TABLET: 25; 100 TABLET ORAL at 04:30

## 2024-09-26 RX ADMIN — POLYETHYLENE GLYCOL 3350 238 G: 17 POWDER, FOR SOLUTION ORAL at 16:43

## 2024-09-26 RX ADMIN — SODIUM CHLORIDE: 9 INJECTION, SOLUTION INTRAVENOUS at 10:21

## 2024-09-26 RX ADMIN — CARBIDOPA AND LEVODOPA 1 TABLET: 25; 100 TABLET ORAL at 14:07

## 2024-09-26 RX ADMIN — SIMVASTATIN 40 MG: 10 TABLET, FILM COATED ORAL at 21:52

## 2024-09-26 RX ADMIN — CARBIDOPA AND LEVODOPA 1 TABLET: 50; 200 TABLET, EXTENDED RELEASE ORAL at 20:16

## 2024-09-26 RX ADMIN — LISINOPRIL 20 MG: 20 TABLET ORAL at 08:41

## 2024-09-26 RX ADMIN — PANTOPRAZOLE SODIUM 40 MG: 40 TABLET, DELAYED RELEASE ORAL at 20:15

## 2024-09-26 RX ADMIN — CARBIDOPA AND LEVODOPA 1 TABLET: 50; 200 TABLET, EXTENDED RELEASE ORAL at 14:07

## 2024-09-26 RX ADMIN — CARBIDOPA AND LEVODOPA 1 TABLET: 50; 200 TABLET, EXTENDED RELEASE ORAL at 10:21

## 2024-09-26 RX ADMIN — ACETAMINOPHEN 650 MG: 325 TABLET ORAL at 20:17

## 2024-09-26 RX ADMIN — CARBIDOPA AND LEVODOPA 1 TABLET: 25; 100 TABLET ORAL at 20:16

## 2024-09-26 ASSESSMENT — ACTIVITIES OF DAILY LIVING (ADL)
ADLS_ACUITY_SCORE: 28
ADLS_ACUITY_SCORE: 24
ADLS_ACUITY_SCORE: 28
ADLS_ACUITY_SCORE: 23
ADLS_ACUITY_SCORE: 24
ADLS_ACUITY_SCORE: 28
ADLS_ACUITY_SCORE: 28
ADLS_ACUITY_SCORE: 24
ADLS_ACUITY_SCORE: 28
ADLS_ACUITY_SCORE: 26
ADLS_ACUITY_SCORE: 23
ADLS_ACUITY_SCORE: 28
ADLS_ACUITY_SCORE: 24
ADLS_ACUITY_SCORE: 28
ADLS_ACUITY_SCORE: 28
ADLS_ACUITY_SCORE: 24
ADLS_ACUITY_SCORE: 23
ADLS_ACUITY_SCORE: 28
ADLS_ACUITY_SCORE: 23

## 2024-09-26 NOTE — PROGRESS NOTES
Daily Progress Note    Assessment/Plan:  84 year old male with PMH PE/DVT on Xarelto, Parkinson's disease, HTN, GERD, HLD presented with bloody BM.       Lower GI bleed,   Suspected diverticular bleed  BRBPR, multiple episodes including 5 reported episodes this morning.  Denies any lightheadedness or dizziness, is not hypotensive, skin does not appear pale.  Checking hemoglobin every 6 hours, due now, discussed with nursing staff.  -Seen by GI, colonoscopy tomorrow, bowel prep this afternoon  - CT abdomen pelvis shows moderate iatrogenic atheromatous plaque but no aneurysm.  No findings to suggest active GI hemorrhage at the time of study. Descending and sigmoid diverticulosis, no diverticulitis  -Hold Xarelto    Acute blood loss anemia: Admitting hemoglobin 13.5, down to 11.1 today but stable, continue to trend every 6 hours, next hemoglobin due now.     History of PE  Drug-induced coagulopathy  - INR 1.93  - Hold Xarelto     Parkinson's disease  PTA Sinemet     HTN  - Monitor BP  -Pressures running a little high so PTA lisinopril 20 mg has been resumed.  Clinically Significant Risk Factors Present on Admission          # Hypocalcemia: Lowest Ca = 8.4 mg/dL in last 2 days, will monitor and replace as appropriate      # Drug Induced Coagulation Defect: home medication list includes an anticoagulant medication    # Hypertension: Noted on problem list         # Overweight: Estimated body mass index is 25.12 kg/m  as calculated from the following:    Height as of this encounter: 1.829 m (6').    Weight as of this encounter: 84 kg (185 lb 3 oz).       # Financial/Environmental Concerns:              Code status:Full Code        Barriers to Discharge: GI bleeding, need for colonoscopy    Disposition: Anticipate discharge in 1 to 2 days.    Subjective:  Bernard is new to me today, chart reviewed and discussed with staff.  His wife Elham is present.  They report 5 bloody stools in the last 4 hours.  He continues to deny any  lightheadedness or dizziness, skin does not appear pale.  No abdominal pain.  No chest pain or shortness of breath, no fevers or chills.  They are aware of the plan for colonoscopy.        Current Medications Reviewed via EHR List    Objective:  Vital signs in last 24 hours:  [unfilled]  .prog  Weight:   @THISENCWEIGHTS(1)@  Weight change:   Body mass index is 25.12 kg/m .    Intake/Output last 3 shifts:  No intake/output data recorded.  Intake/Output this shift:  No intake/output data recorded.    Physical Exam:  General:  CV:  Lungs:  Abdomen:        Imaging:  Personally Reviewed.  CTA Abdomen Pelvis with Contrast    Result Date: 9/25/2024  EXAM: CTA ABDOMEN PELVIS WITH CONTRAST LOCATION: [facility] DATE: 9/25/2024 INDICATION: GI bleed on Xarelto COMPARISON: CT abdomen pelvis without contrast 8/29/2023 TECHNIQUE: CT angiogram abdomen pelvis during arterial phase of injection of IV contrast. 2D and 3D MIP reconstructions were performed by the CT technologist. Dose reduction techniques were used. CONTRAST: IsoVue 370 90mL FINDINGS: ANGIOGRAM ABDOMEN/PELVIS: There is moderate, patchy calcified plaque throughout the descending thoracic aorta, abdominal aorta, and the iliac arteries. No abdominal aortic aneurysm or critical stenosis. The celiac axis, SMA and their named proximal branches are widely patent. Patent HENRY. Single bilateral renal arteries have mild proximal calcified plaque but no flow limiting narrowing. No endoluminal blush or contrast accumulation within the bowel on arterial or portal venous phase acquisitions to suggest a point source of acute gastrointestinal hemorrhage. LOWER CHEST: Subsegmental territories of atelectasis in the bases. Small hiatal hernia. Minimal calcification of the aortic root. HEPATOBILIARY: Post cholecystectomy. No bile duct enlargement. Liver is normal in size with smooth capsule. No liver lesions. PANCREAS: Normal. SPLEEN: Normal. ADRENAL GLANDS:  "Normal. KIDNEYS/BLADDER: No significant mass, stones, or hydronephrosis. There are simple or benign cysts. No follow up is needed. BOWEL: There are multiple diverticula arising from the descending and sigmoid colon. No diverticular or bowel wall thickening. No dilated bowel or focal bowel caliber transition. No inflammatory stranding in the mesentery. No peritoneal thickening or ascites. LYMPH NODES: Normal. PELVIC ORGANS: Status post prostatectomy. No pelvic mass or free fluid. MUSCULOSKELETAL: Status post left inguinal hernia repair. Multilevel low thoracic and lumbar disc space narrowing and marginal osteophytes. Vacuum disc phenomenon are present at L1-L2, L4-5 and L5-S1.     IMPRESSION: 1.  Moderate aortoiliac atheromatous plaque but no aneurysm. No acute abnormalities of the mesenteric arteries. 2.  No findings to suggest point source of acute gastrointestinal hemorrhage. 3.  Descending and sigmoid diverticulosis but no diverticulitis.      Lab Results:  Personally Reviewed.   Fingerstick Blood Glucose: @KWVHRHR40QJL(POCGLUFGR:10)@    Last Hbg A1C: No results found for: \"HGBA1C\"   Lab Results   Component Value Date    INR 1.93 (H) 09/25/2024    INR 1.18 (H) 12/15/2020     Recent Results (from the past 24 hour(s))   Hemoglobin    Collection Time: 09/25/24  6:22 PM   Result Value Ref Range    Hemoglobin 12.2 (L) 13.3 - 17.7 g/dL   Hemoglobin    Collection Time: 09/26/24 12:14 AM   Result Value Ref Range    Hemoglobin 11.2 (L) 13.3 - 17.7 g/dL   Basic metabolic panel    Collection Time: 09/26/24  5:39 AM   Result Value Ref Range    Sodium 141 135 - 145 mmol/L    Potassium 4.3 3.4 - 5.3 mmol/L    Chloride 110 (H) 98 - 107 mmol/L    Carbon Dioxide (CO2) 24 22 - 29 mmol/L    Anion Gap 7 7 - 15 mmol/L    Urea Nitrogen 16.5 8.0 - 23.0 mg/dL    Creatinine 0.73 0.67 - 1.17 mg/dL    GFR Estimate >90 >60 mL/min/1.73m2    Calcium 8.4 (L) 8.8 - 10.4 mg/dL    Glucose 92 70 - 99 mg/dL   CBC with platelets    Collection Time: " 09/26/24  5:39 AM   Result Value Ref Range    WBC Count 7.3 4.0 - 11.0 10e3/uL    RBC Count 3.36 (L) 4.40 - 5.90 10e6/uL    Hemoglobin 11.1 (L) 13.3 - 17.7 g/dL    Hematocrit 33.5 (L) 40.0 - 53.0 %     78 - 100 fL    MCH 33.0 26.5 - 33.0 pg    MCHC 33.1 31.5 - 36.5 g/dL    RDW 13.9 10.0 - 15.0 %    Platelet Count 195 150 - 450 10e3/uL   Hemoglobin    Collection Time: 09/26/24  5:39 AM   Result Value Ref Range    Hemoglobin 11.1 (L) 13.3 - 17.7 g/dL       Medically Ready for Discharge: Anticipated in 2-4 Days       Advanced Care Planning    Medical Decision Making       50 MINUTES SPENT BY ME on the date of service doing chart review, history, exam, documentation & further activities per the note.      Marino Gerber MD  Date: 9/26/2024  Time: 10:56 AM  [facility] Service  Family Medicine

## 2024-09-26 NOTE — PLAN OF CARE
Problem: Adult Inpatient Plan of Care  Goal: Plan of Care Review  Description: The Plan of Care Review/Shift note should be completed every shift.  The Outcome Evaluation is a brief statement about your assessment that the patient is improving, declining, or no change.  This information will be displayed automatically on your shift  note.  Flowsheets (Taken 9/25/2024 2213)  Plan of Care Reviewed With: patient  Overall Patient Progress: improving  Goal: Absence of Hospital-Acquired Illness or Injury  Intervention: Identify and Manage Fall Risk  Recent Flowsheet Documentation  Taken 9/25/2024 2053 by Hannah Weber RN  Safety Promotion/Fall Prevention: activity supervised  Intervention: Prevent and Manage VTE (Venous Thromboembolism) Risk  Recent Flowsheet Documentation  Taken 9/25/2024 2053 by Hannah Weber RN  VTE Prevention/Management: SCDs on (sequential compression devices)  Goal: Readiness for Transition of Care  Intervention: Mutually Develop Transition Plan  Recent Flowsheet Documentation  Taken 9/25/2024 2000 by Hannah Weber RN  Equipment Currently Used at Home: none     Problem: Risk for Delirium  Goal: Improved Behavioral Control  Intervention: Minimize Safety Risk  Recent Flowsheet Documentation  Taken 9/25/2024 2053 by Hannah Weber RN  Enhanced Safety Measures: monitor patients coagulation values   Goal Outcome Evaluation:      Plan of Care Reviewed With: patient    Overall Patient Progress: improvingOverall Patient Progress: improving

## 2024-09-26 NOTE — PROGRESS NOTES
Patient transferring to P4 room 429, report called to receiving RN.   [FreeTextEntry1] : Annual Wellness examination [de-identified] : 66 yrs old female here for annual wellness examination. New history: 3/2024 Had the flu with asthma flare. Also has flares around pets- especially dogs. PSH:  Ovarian cyst benign removal. 2 Csections ,  PMH: Asthma Allergic rhinitis Severe allergies to tree pollen, dust, and mold Polyps nasal Recurrent sinus infections small cataracts Hyperlipidemia Allergies: NKDA. Aloe causes rash Allergic to dogs Medication: See EHR- reconciliation done and EHR list updated. Vitamins: Multi women's, VItamin D3, Vitamin C, B complex, Vitamin E 400 IU QD, biotin- added SH: Nonsmoker, prior smoker. ETOH social. No marijuana or drugs. FH: Mother  93 heart disease, HLD. Father : 30s-40s Colon cancer, Wegener disease  88.  Diet: Regular- more vegetables, fish. recent vacation was off diet some. Exercise: Peloton daily. Pilates new.    Preventive screening:   Breast imaging : Mammogram -due 10/2024 - last normal- does annually   GYN/pelvic imagin2024 Good report   Bone density: 23: HX osteopenia- improved - due    CRC screening:  Colonoscopy - Dr Wiseman due in 1-2 yrs  Ophthalmology: Optometry 2024 and opthalmology 2 mos ago good exam- small cataracts   Cardiac:No recent   Labwork: Full labwork ordered- will add IGE level  Need for lung cancer screening: Smoker within 15 yrs of 20 +pack yrs/over 50: Neg   Dermatology: No recent   Dental: Q 3-6 mos   Fall risk: no falls   Advance directives: Not yet  Depression: Neg

## 2024-09-26 NOTE — PLAN OF CARE
Goal Outcome Evaluation:      Plan of Care Reviewed With: patient    Overall Patient Progress: improvingOverall Patient Progress: improving     Pt A&Ox. Denies pain. On room air. VSS except BP elevated. NPO. NS running 60 ml/per hour. Had 7 red, bloody watery stools during the shift. Last AM Hemoglobin 11.1. RBC 3.36. Provider Jessica Anaya notified.

## 2024-09-26 NOTE — PLAN OF CARE
"  Problem: Adult Inpatient Plan of Care  Goal: Patient-Specific Goal (Individualized)  Description: You can add care plan individualizations to a care plan. Examples of Individualization might be:  \"Parent requests to be called daily at 9am for status\", \"I have a hard time hearing out of my right ear\", or \"Do not touch me to wake me up as it startles  me\".  9/26/2024 1422 by Nena Peres, RN  Outcome: Progressing  9/26/2024 1422 by Nena Peres, RN  Outcome: Progressing     Problem: Anemia  Goal: Anemia Symptom Improvement  Outcome: Progressing  Intervention: Monitor and Manage Anemia  Recent Flowsheet Documentation  Taken 9/26/2024 0840 by Nena Peres, RN  Safety Promotion/Fall Prevention: activity supervised   Goal Outcome Evaluation:       Patient alert and orientated x4, able to make needs known. Blood pressure slightly elevated in the 140's at start of shift, improve after scheduled medication. Patient denies pain, no nausea or vomiting. Multiple stools this shift with little clots noted, hemoglobin stable at 10.8. Continue to monitor.                 "

## 2024-09-26 NOTE — CONSULTS
[facility] - Digestive [facility] Consultation     Bernard Sanz  [address] [place] [address]  84 year old male     Admission Date/Time: 9/25/2024  Primary Care Provider: Tammy Boyle     We were asked to see the patient in consultation by Dr. Taylor for evaluation of lower GI bleed.    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    # Rectal bleeding  Acute onset of hematochezia that began on 9/25/2024.  He has ongoing bleeding.  CTA negative for active GI bleeding.  No abnormalities with the mesenteric vessels.  No prior history of symptoms.  No recent colonoscopy.    - Hemoglobin has trended down to 11.1 today (previously 14.2 in April 2024).  -Differential diagnosis includes diverticular bleed, ischemic colitis, versus acute infection.  Doubtful of IBD given his age.    RECOMMENDATIONS:  -Plan for colonoscopy tomorrow.  He will need colonoscopy prep this afternoon, this has been ordered.    -Continue with periodic hemoglobin checks, transfuse if necessary    -Continue to hold Xarelto    -Diet: Clears then n.p.o. at midnight.  Avoid red products.      Case discussed with Dr. JOEY Quevedo, please review MD addendum below.    45 minutes of total time was spent providing patient care, including patient evaluation, reviewing documentation/test results, and     Willie Rendon PA-C  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    Patient's wife is present in the room.     Pt explains sudden onset of bloody  stools/maroon-colored stools starting yesterday at 4 AM.  He was doing fine prior to this.  He has no associated abdominal pains, nausea, vomiting, fevers, chills.  He denies prior history of symptoms.  His most recent colonoscopy was 10+ years ago, I do not have these records to review.  He denies NSAID use.  He is on chronic Xarelto for history of PE/DVT.  Last dose of Xarelto was 1 day ago.  Since being in the hospital, he has had several bloody stools yesterday evening and again in the morning.    In the ED:   - Vitals: Afebrile.  /79.  - Labs: Hemoglobin down to 11.1 (previously 14.2 in April 2024).  CBC and BMP otherwise unremarkable.  INR 1.93.  - Imaging: CTA abdomen/pelvis with contrast on 9/25/2024 was negative for acute GI bleeding.  Other findings noted moderate aortoiliac atheromatous plaque but no aneurysm, sigmoid and descending colon diverticulosis without diverticulitis.  No acute abnormalities of the mesenteric arteries.    Previous endoscopic evaluation:  - Multiple EGD's (7/27/20174/20/2017, 1/13/2017, 11/17/2016, 8/18/2016): Done for Musa's surveillance/management.  Findings noted long segment Musa's esophagus treated with radiofrequency ablation/Halo90. 7 cm hiatal hernia seen, duodenum normal.      ROS: A comprehensive ten point review of systems was negative aside from those in mentioned in the HPI.      PAST MEDICAL HISTORY:  Patient Active Problem List    Diagnosis Date Noted    Lower GI bleeding 09/25/2024     Priority: Medium    Anticoagulated 09/25/2024     Priority: Medium    History of Parkinson's disease 09/25/2024     Priority: Medium    Acute chest pain 04/11/2024     Priority: Medium    Pneumothorax on left 04/11/2024     Priority: Medium    Hearing loss 04/23/2020     Priority: Medium    Hypertension 04/23/2020     Priority: Medium    Pure hypercholesterolemia 04/23/2020     Priority: Medium    Malignant neoplasm of skin 08/03/2017     Priority: Medium     8/10/18 left  cheek, nodular BCC, Mohs 11/12/18 [place]  8/10/18, left dorsal hand, at least SCCIS, excised with + margin, monitor  8/10/18 left antecubital, nodular BCC,  ED&C'd  8/10/18 left shoulder, nodular BCC, excised [place]  8/10/18, left calf, superficial/nodular BCC, ED&C'd  2000-Right postauricular-Unknown type skin cancer, possibly treated with Mohs surgery (Patient reported)      History of CVA (cerebrovascular accident) 10/26/2015     Priority: Medium    History of pulmonary embolism 10/26/2015     Priority: Medium    Parkinson's disease (H) 10/26/2015     Priority: Medium    DVT of lower limb, acute (H) 11/05/2013     Priority: Medium    Pulmonary embolism (H) 11/05/2013     Priority: Medium    Barretts esophagus 10/16/2013     Priority: Medium    Calculus of kidney 10/16/2013     Priority: Medium    Prostate cancer (H) 10/16/2013     Priority: Medium    Sensorineural hearing loss, asymmetrical 04/05/2011     Priority: Medium    Rotator cuff tear 02/23/2009     Priority: Medium    Cerebral infarction (H) 10/02/1991     Priority: Medium     SOCIAL HISTORY:  Social History     Tobacco Use    Smoking status: Former    Smokeless tobacco: Never   Substance Use Topics    Alcohol use: Yes     Comment: Alcoholic Drinks/day: 3x per week     FAMILY HISTORY:  Family History   Problem Relation Age of Onset    Cancer Father         throat    Diabetes Maternal Grandfather     Urolithiasis No family hx of     Clotting Disorder No family hx of     Gout No family hx of     Heart Disease No family hx of      ALLERGIES:   Allergies   Allergen Reactions    Tetracycline      MEDICATIONS:   Current Facility-Administered Medications   Medication Dose Route Frequency Provider Last Rate Last Admin    acetaminophen (TYLENOL) tablet 650 mg  650 mg Oral Q4H PRN Kayla Taylor MD        Or    acetaminophen (TYLENOL) Suppository 650 mg  650 mg Rectal Q4H PRN Kayla Taylor MD        carbidopa-levodopa (SINEMET CR)  MG per CR  tablet 1 tablet  1 tablet Oral 4x Daily Kayla Taylor MD   1 tablet at 09/26/24 0410    carbidopa-levodopa (SINEMET)  MG per tablet 1 tablet  1 tablet Oral 4x Daily Kayla Taylor MD   1 tablet at 09/26/24 0430    gabapentin (NEURONTIN) capsule 100 mg  100 mg Oral Daily PRN Kayla Taylor MD        lisinopril (ZESTRIL) tablet 20 mg  20 mg Oral QAM Kayla Taylor MD   20 mg at 09/26/24 0841    pantoprazole (PROTONIX) EC tablet 40 mg  40 mg Oral BID Kayla Taylor MD   40 mg at 09/26/24 0841    [Held by provider] rivaroxaban ANTICOAGULANT (XARELTO) tablet 20 mg  20 mg Oral At Bedtime Kayla Taylor MD        sensiddhartha-margarette (SENOKOT-S/PERICOLACE) 8.6-50 MG per tablet 1 tablet  1 tablet Oral BID PRN Kayla Taylor MD        Or    senna-docusate (SENOKOT-S/PERICOLACE) 8.6-50 MG per tablet 2 tablet  2 tablet Oral BID PRN Kayla Taylor MD        simvastatin (ZOCOR) tablet 40 mg  40 mg Oral At Bedtime Kayla Taylor MD   40 mg at 09/25/24 2202    sodium chloride 0.9 % infusion   Intravenous Continuous Kayla Taylor MD 60 mL/hr at 09/25/24 1726 New Bag at 09/25/24 1726       PHYSICAL EXAM:   BP (!) 144/79 (BP Location: Left arm)   Pulse 64   Temp 98.1  F (36.7  C) (Oral)   Resp 17   Ht 1.829 m (6')   Wt 84 kg (185 lb 3 oz)   SpO2 97%   BMI 25.12 kg/m     GEN: Alert, oriented x3, communicative and in NAD.  PHUONG: AT, anicteric, OP without erythema, exudate, or ulcers.    NECK: Supple.    LYMPH: No LAD noted.  HRT: RRR  LUNGS: CTA  ABD: ND, +BS, no guarding or pain to palpation  SKIN: No rash, jaundice or spider angiomata  MSKL: LE free of edema  NEURO: CN grossly intact     ADDITIONAL DATA:   I reviewed the patient's new clinical lab test results.   Recent Labs   Lab Test 09/26/24  0539 09/26/24  0014 09/25/24  1822 09/25/24  0938 04/11/24  1626 12/16/20  0358 12/15/20  1207   WBC 7.3  --   --  7.9 8.2   < > 8.5   HGB 11.1*  11.1* 11.2* 12.2* 13.5 14.2   < > 14.1     --   --   101* 101*   < > 100     --   --  239 245   < > 245   INR  --   --   --  1.93*  --   --  1.18*    < > = values in this interval not displayed.     Recent Labs   Lab Test 09/26/24  0539 09/25/24  0938 04/13/24  0615   POTASSIUM 4.3 4.7 4.1   CHLORIDE 110* 106 107   CO2 24 26 27   BUN 16.5 21.1 12.9   CR 0.73 0.83 0.66*   ANIONGAP 7 8 8     Recent Labs   Lab Test 09/25/24  0938 12/16/20  0358 04/07/18  2300 04/07/18  2250   ALBUMIN 4.0 2.9*  --  4.0   BILITOTAL 0.9 0.6  --  1.1*   ALT <5 <9  --  12   AST 19 12  --  17   PROTEIN  --   --  50 mg/dL*  --    LIPASE  --   --   --  24        IMAGING:  I reviewed the patient's new imaging results.        Narrative & Impression   EXAM: CTA ABDOMEN PELVIS WITH CONTRAST  LOCATION: [facility]  DATE: 9/25/2024     INDICATION: GI bleed on Xarelto  COMPARISON: CT abdomen pelvis without contrast 8/29/2023  TECHNIQUE: CT angiogram abdomen pelvis during arterial phase of injection of IV contrast. 2D and 3D MIP reconstructions were performed by the CT technologist. Dose reduction techniques were used.  CONTRAST: IsoVue 370 90mL     FINDINGS:  ANGIOGRAM ABDOMEN/PELVIS: There is moderate, patchy calcified plaque throughout the descending thoracic aorta, abdominal aorta, and the iliac arteries. No abdominal aortic aneurysm or critical stenosis. The celiac axis, SMA and their named proximal   branches are widely patent. Patent HENRY. Single bilateral renal arteries have mild proximal calcified plaque but no flow limiting narrowing.     No endoluminal blush or contrast accumulation within the bowel on arterial or portal venous phase acquisitions to suggest a point source of acute gastrointestinal hemorrhage.     LOWER CHEST: Subsegmental territories of atelectasis in the bases. Small hiatal hernia. Minimal calcification of the aortic root.     HEPATOBILIARY: Post cholecystectomy. No bile duct enlargement. Liver is normal in size with smooth capsule. No liver  lesions.     PANCREAS: Normal.     SPLEEN: Normal.     ADRENAL GLANDS: Normal.     KIDNEYS/BLADDER: No significant mass, stones, or hydronephrosis. There are simple or benign cysts. No follow up is needed.     BOWEL: There are multiple diverticula arising from the descending and sigmoid colon. No diverticular or bowel wall thickening. No dilated bowel or focal bowel caliber transition. No inflammatory stranding in the mesentery. No peritoneal thickening or   ascites.     LYMPH NODES: Normal.     PELVIC ORGANS: Status post prostatectomy. No pelvic mass or free fluid.     MUSCULOSKELETAL: Status post left inguinal hernia repair. Multilevel low thoracic and lumbar disc space narrowing and marginal osteophytes. Vacuum disc phenomenon are present at L1-L2, L4-5 and L5-S1.                                                                      IMPRESSION:     1.  Moderate aortoiliac atheromatous plaque but no aneurysm. No acute abnormalities of the mesenteric arteries.  2.  No findings to suggest point source of acute gastrointestinal hemorrhage.  3.  Descending and sigmoid diverticulosis but no diverticulitis.

## 2024-09-27 ENCOUNTER — ANESTHESIA (OUTPATIENT)
Dept: SURGERY | Facility: HOSPITAL | Age: 84
DRG: 378 | End: 2024-09-27
Payer: COMMERCIAL

## 2024-09-27 LAB
COLONOSCOPY: NORMAL
HGB BLD-MCNC: 9 G/DL (ref 13.3–17.7)
HGB BLD-MCNC: 9.9 G/DL (ref 13.3–17.7)
HOLD SPECIMEN: NORMAL

## 2024-09-27 PROCEDURE — 250N000013 HC RX MED GY IP 250 OP 250 PS 637: Performed by: STUDENT IN AN ORGANIZED HEALTH CARE EDUCATION/TRAINING PROGRAM

## 2024-09-27 PROCEDURE — 85018 HEMOGLOBIN: CPT | Performed by: STUDENT IN AN ORGANIZED HEALTH CARE EDUCATION/TRAINING PROGRAM

## 2024-09-27 PROCEDURE — 120N000001 HC R&B MED SURG/OB

## 2024-09-27 PROCEDURE — 250N000009 HC RX 250: Performed by: NURSE ANESTHETIST, CERTIFIED REGISTERED

## 2024-09-27 PROCEDURE — 258N000003 HC RX IP 258 OP 636: Performed by: STUDENT IN AN ORGANIZED HEALTH CARE EDUCATION/TRAINING PROGRAM

## 2024-09-27 PROCEDURE — 250N000011 HC RX IP 250 OP 636: Performed by: NURSE ANESTHETIST, CERTIFIED REGISTERED

## 2024-09-27 PROCEDURE — 999N000141 HC STATISTIC PRE-PROCEDURE NURSING ASSESSMENT: Performed by: INTERNAL MEDICINE

## 2024-09-27 PROCEDURE — 85018 HEMOGLOBIN: CPT | Performed by: INTERNAL MEDICINE

## 2024-09-27 PROCEDURE — 250N000013 HC RX MED GY IP 250 OP 250 PS 637: Performed by: PHYSICIAN ASSISTANT

## 2024-09-27 PROCEDURE — 36415 COLL VENOUS BLD VENIPUNCTURE: CPT | Performed by: STUDENT IN AN ORGANIZED HEALTH CARE EDUCATION/TRAINING PROGRAM

## 2024-09-27 PROCEDURE — 272N000001 HC OR GENERAL SUPPLY STERILE: Performed by: INTERNAL MEDICINE

## 2024-09-27 PROCEDURE — 99232 SBSQ HOSP IP/OBS MODERATE 35: CPT | Performed by: INTERNAL MEDICINE

## 2024-09-27 PROCEDURE — 360N000075 HC SURGERY LEVEL 2, PER MIN: Performed by: INTERNAL MEDICINE

## 2024-09-27 PROCEDURE — 36415 COLL VENOUS BLD VENIPUNCTURE: CPT | Performed by: INTERNAL MEDICINE

## 2024-09-27 PROCEDURE — 370N000017 HC ANESTHESIA TECHNICAL FEE, PER MIN: Performed by: INTERNAL MEDICINE

## 2024-09-27 PROCEDURE — 0DBK8ZZ EXCISION OF ASCENDING COLON, VIA NATURAL OR ARTIFICIAL OPENING ENDOSCOPIC: ICD-10-PCS | Performed by: INTERNAL MEDICINE

## 2024-09-27 PROCEDURE — 88305 TISSUE EXAM BY PATHOLOGIST: CPT | Mod: TC | Performed by: INTERNAL MEDICINE

## 2024-09-27 PROCEDURE — 258N000003 HC RX IP 258 OP 636: Performed by: ANESTHESIOLOGY

## 2024-09-27 RX ORDER — ACETAMINOPHEN 325 MG/1
975 TABLET ORAL
Status: DISCONTINUED | OUTPATIENT
Start: 2024-09-27 | End: 2024-09-27 | Stop reason: HOSPADM

## 2024-09-27 RX ORDER — ONDANSETRON 2 MG/ML
4 INJECTION INTRAMUSCULAR; INTRAVENOUS
Status: DISCONTINUED | OUTPATIENT
Start: 2024-09-27 | End: 2024-09-27 | Stop reason: HOSPADM

## 2024-09-27 RX ORDER — LIDOCAINE HYDROCHLORIDE 10 MG/ML
INJECTION, SOLUTION INFILTRATION; PERINEURAL PRN
Status: DISCONTINUED | OUTPATIENT
Start: 2024-09-27 | End: 2024-09-27

## 2024-09-27 RX ORDER — DEXAMETHASONE SODIUM PHOSPHATE 4 MG/ML
4 INJECTION, SOLUTION INTRA-ARTICULAR; INTRALESIONAL; INTRAMUSCULAR; INTRAVENOUS; SOFT TISSUE
Status: DISCONTINUED | OUTPATIENT
Start: 2024-09-27 | End: 2024-09-27 | Stop reason: HOSPADM

## 2024-09-27 RX ORDER — PROPOFOL 10 MG/ML
INJECTION, EMULSION INTRAVENOUS PRN
Status: DISCONTINUED | OUTPATIENT
Start: 2024-09-27 | End: 2024-09-27

## 2024-09-27 RX ORDER — ONDANSETRON 4 MG/1
4 TABLET, ORALLY DISINTEGRATING ORAL EVERY 30 MIN PRN
Status: DISCONTINUED | OUTPATIENT
Start: 2024-09-27 | End: 2024-09-27 | Stop reason: HOSPADM

## 2024-09-27 RX ORDER — PROPOFOL 10 MG/ML
INJECTION, EMULSION INTRAVENOUS CONTINUOUS PRN
Status: DISCONTINUED | OUTPATIENT
Start: 2024-09-27 | End: 2024-09-27

## 2024-09-27 RX ORDER — ONDANSETRON 2 MG/ML
4 INJECTION INTRAMUSCULAR; INTRAVENOUS EVERY 30 MIN PRN
Status: DISCONTINUED | OUTPATIENT
Start: 2024-09-27 | End: 2024-09-27 | Stop reason: HOSPADM

## 2024-09-27 RX ORDER — LIDOCAINE 40 MG/G
CREAM TOPICAL
Status: DISCONTINUED | OUTPATIENT
Start: 2024-09-27 | End: 2024-09-27 | Stop reason: HOSPADM

## 2024-09-27 RX ORDER — NALOXONE HYDROCHLORIDE 1 MG/ML
0.1 INJECTION INTRAMUSCULAR; INTRAVENOUS; SUBCUTANEOUS
Status: DISCONTINUED | OUTPATIENT
Start: 2024-09-27 | End: 2024-09-27 | Stop reason: HOSPADM

## 2024-09-27 RX ORDER — SODIUM CHLORIDE, SODIUM LACTATE, POTASSIUM CHLORIDE, CALCIUM CHLORIDE 600; 310; 30; 20 MG/100ML; MG/100ML; MG/100ML; MG/100ML
INJECTION, SOLUTION INTRAVENOUS CONTINUOUS
Status: DISCONTINUED | OUTPATIENT
Start: 2024-09-27 | End: 2024-09-27 | Stop reason: HOSPADM

## 2024-09-27 RX ORDER — ONDANSETRON 2 MG/ML
INJECTION INTRAMUSCULAR; INTRAVENOUS PRN
Status: DISCONTINUED | OUTPATIENT
Start: 2024-09-27 | End: 2024-09-27

## 2024-09-27 RX ADMIN — CARBIDOPA AND LEVODOPA 1 TABLET: 50; 200 TABLET, EXTENDED RELEASE ORAL at 13:26

## 2024-09-27 RX ADMIN — SIMVASTATIN 40 MG: 10 TABLET, FILM COATED ORAL at 21:29

## 2024-09-27 RX ADMIN — SODIUM CHLORIDE: 9 INJECTION, SOLUTION INTRAVENOUS at 09:55

## 2024-09-27 RX ADMIN — SODIUM CHLORIDE: 9 INJECTION, SOLUTION INTRAVENOUS at 16:56

## 2024-09-27 RX ADMIN — CARBIDOPA AND LEVODOPA 1 TABLET: 25; 100 TABLET ORAL at 13:25

## 2024-09-27 RX ADMIN — ONDANSETRON 4 MG: 2 INJECTION INTRAMUSCULAR; INTRAVENOUS at 08:00

## 2024-09-27 RX ADMIN — CARBIDOPA AND LEVODOPA 1 TABLET: 25; 100 TABLET ORAL at 10:04

## 2024-09-27 RX ADMIN — PANTOPRAZOLE SODIUM 40 MG: 40 TABLET, DELAYED RELEASE ORAL at 21:29

## 2024-09-27 RX ADMIN — CARBIDOPA AND LEVODOPA 1 TABLET: 25; 100 TABLET ORAL at 03:59

## 2024-09-27 RX ADMIN — LIDOCAINE HYDROCHLORIDE 5 ML: 10 INJECTION, SOLUTION INFILTRATION; PERINEURAL at 08:00

## 2024-09-27 RX ADMIN — SODIUM CHLORIDE, POTASSIUM CHLORIDE, SODIUM LACTATE AND CALCIUM CHLORIDE: 600; 310; 30; 20 INJECTION, SOLUTION INTRAVENOUS at 07:58

## 2024-09-27 RX ADMIN — PROPOFOL 20 MG: 10 INJECTION, EMULSION INTRAVENOUS at 08:00

## 2024-09-27 RX ADMIN — CARBIDOPA AND LEVODOPA 1 TABLET: 50; 200 TABLET, EXTENDED RELEASE ORAL at 16:54

## 2024-09-27 RX ADMIN — PROPOFOL 150 MCG/KG/MIN: 10 INJECTION, EMULSION INTRAVENOUS at 08:00

## 2024-09-27 RX ADMIN — CARBIDOPA AND LEVODOPA 1 TABLET: 50; 200 TABLET, EXTENDED RELEASE ORAL at 03:59

## 2024-09-27 RX ADMIN — MAGNESIUM CITRATE 296 ML: 1.75 LIQUID ORAL at 04:00

## 2024-09-27 RX ADMIN — CARBIDOPA AND LEVODOPA 1 TABLET: 50; 200 TABLET, EXTENDED RELEASE ORAL at 10:04

## 2024-09-27 RX ADMIN — CARBIDOPA AND LEVODOPA 1 TABLET: 25; 100 TABLET ORAL at 16:54

## 2024-09-27 ASSESSMENT — ACTIVITIES OF DAILY LIVING (ADL)
ADLS_ACUITY_SCORE: 28
ADLS_ACUITY_SCORE: 26
ADLS_ACUITY_SCORE: 25
ADLS_ACUITY_SCORE: 24
ADLS_ACUITY_SCORE: 28
ADLS_ACUITY_SCORE: 25
ADLS_ACUITY_SCORE: 24
ADLS_ACUITY_SCORE: 25
ADLS_ACUITY_SCORE: 26
ADLS_ACUITY_SCORE: 24
ADLS_ACUITY_SCORE: 25
ADLS_ACUITY_SCORE: 28
ADLS_ACUITY_SCORE: 26
ADLS_ACUITY_SCORE: 24
ADLS_ACUITY_SCORE: 28
ADLS_ACUITY_SCORE: 28
ADLS_ACUITY_SCORE: 26
ADLS_ACUITY_SCORE: 25
ADLS_ACUITY_SCORE: 30
ADLS_ACUITY_SCORE: 25
ADLS_ACUITY_SCORE: 26

## 2024-09-27 NOTE — ANESTHESIA CARE TRANSFER NOTE
Patient: Bernard Sanz    Procedure: Procedure(s):  COLONOSCOPY, polypectomy x3       Diagnosis: Lower GI bleeding [K92.2]  Diagnosis Additional Information: No value filed.    Anesthesia Type:   MAC     Note:    Oropharynx: oropharynx clear of all foreign objects and spontaneously breathing  Level of Consciousness: awake  Oxygen Supplementation: room air    Independent Airway: airway patency satisfactory and stable  Dentition: dentition unchanged  Vital Signs Stable: post-procedure vital signs reviewed and stable  Report to RN Given: handoff report given  Patient transferred to: Medical/Surgical Unit    Handoff Report: Identifed the Patient, Identified the Reponsible Provider, Reviewed the pertinent medical history, Discussed the surgical course, Reviewed Intra-OP anesthesia mangement and issues during anesthesia, Set expectations for post-procedure period and Allowed opportunity for questions and acknowledgement of understanding      Vitals:  Vitals Value Taken Time   /76    Temp 36.4    Pulse 61    Resp 14    SpO2 98        Electronically Signed By: JEAN MARIE Pelayo CRNA  September 27, 2024  9:10 AM

## 2024-09-27 NOTE — ANESTHESIA PREPROCEDURE EVALUATION
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    DJD tarsometatarsal joint right foot    DVT (deep venous thrombosis) (H) 10/2013    left leg with progression to pulmonary emboli    Erectile dysfunction     GERD (gastroesophageal reflux disease)     Hearing loss of both ears     High cholesterol     Per pt conversation    HTN (hypertension)     Hypercholesteremia     Hypertension     Kidney stone     Kidney stones 10/16/2013    Parkinson disease (H) 10/26/2015    Parkinson's disease (H)     Plantar fasciitis of right foot 10/2019    Prostate cancer (H) 10/16/2013    Pulmonary emboli (H)       Past Surgical History:   Procedure Laterality Date    APPENDECTOMY      Per pt conversation    Musa's esophagus s/p ablation      CHOLECYSTECTOMY      CHOLECYSTECTOMY      Per pt conversation    IR PULMONARY ANGIOGRAM BILATERAL  12/15/2020    IR PULMONARY ANGIOGRAM BILATERAL  12/15/2020    PROSTATECTOMY      Per pt conversation    TONSILLECTOMY      Per pt conversation      Allergies   Allergen Reactions    Tetracycline       Social History     Tobacco Use    Smoking status: Former    Smokeless tobacco: Never   Substance Use Topics    Alcohol use: Yes     Comment: Alcoholic Drinks/day: 3x per week      Wt Readings from Last 1 Encounters:   24 84 kg (185 lb 3 oz)        Anesthesia Evaluation   Pt has had prior anesthetic. Type: General.    No history of anesthetic complications       ROS/MED HX  ENT/Pulmonary:       Neurologic: Comment: SNHL.    (+)          CVA,    TIA,   Parkinson's disease,               Cardiovascular:     (+) Dyslipidemia  "hypertension- -   -  - -   Taking blood thinners                                   METS/Exercise Tolerance:     Hematologic:     (+) History of blood clots,    pt is anticoagulated,           Musculoskeletal:       GI/Hepatic: Comment: Lower GIB.    (+) GERD,  esophageal disease,                 Renal/Genitourinary:     (+) renal disease,      Nephrolithiasis ,       Endo:       Psychiatric/Substance Use:       Infectious Disease:       Malignancy:   (+) Malignancy, History of Prostate.    Other:            Physical Exam    Airway        Mallampati: III   TM distance: > 3 FB   Neck ROM: full   Mouth opening: > 3 cm    Respiratory Devices and Support         Dental       (+) Modest Abnormalities - crowns, retainers, 1 or 2 missing teeth      Cardiovascular   cardiovascular exam normal          Pulmonary   pulmonary exam normal                OUTSIDE LABS:  CBC:   Lab Results   Component Value Date    WBC 7.3 09/26/2024    WBC 7.9 09/25/2024    HGB 9.1 (L) 09/26/2024    HGB 11.1 (L) 09/26/2024    HCT 33.5 (L) 09/26/2024    HCT 41.9 09/25/2024     09/26/2024     09/25/2024     BMP:   Lab Results   Component Value Date     09/26/2024     09/25/2024    POTASSIUM 4.3 09/26/2024    POTASSIUM 4.7 09/25/2024    CHLORIDE 110 (H) 09/26/2024    CHLORIDE 106 09/25/2024    CO2 24 09/26/2024    CO2 26 09/25/2024    BUN 16.5 09/26/2024    BUN 21.1 09/25/2024    CR 0.73 09/26/2024    CR 0.83 09/25/2024    GLC 92 09/26/2024     (H) 09/25/2024     COAGS:   Lab Results   Component Value Date    PTT 35 09/25/2024    INR 1.93 (H) 09/25/2024     POC: No results found for: \"BGM\", \"HCG\", \"HCGS\"  HEPATIC:   Lab Results   Component Value Date    ALBUMIN 4.0 09/25/2024    PROTTOTAL 6.9 09/25/2024    ALT <5 09/25/2024    AST 19 09/25/2024    ALKPHOS 91 09/25/2024    BILITOTAL 0.9 09/25/2024     OTHER:   Lab Results   Component Value Date    LACT 1.7 12/15/2020    A1C 5.7 09/05/2013    SHRUTHI 8.4 (L) 09/26/2024    " MAG 1.8 12/16/2020    LIPASE 24 04/07/2018    CRP 0.3 04/07/2018       Anesthesia Plan    ASA Status:  3    NPO Status:  NPO Appropriate    Anesthesia Type: MAC.     - Reason for MAC: straight local not clinically adequate      Maintenance: TIVA.        Consents    Anesthesia Plan(s) and associated risks, benefits, and realistic alternatives discussed. Questions answered and patient/representative(s) expressed understanding.     - Discussed:     - Discussed with:  Patient      - Extended Intubation/Ventilatory Support Discussed: No.      - Patient is DNR/DNI Status: No     Use of blood products discussed: No .     Postoperative Care            Comments:    Other Comments: Decadron, Zofran.  Diprivan infusion.  PE, Ephedrine PRN.         James Quinteros MD    I have reviewed the pertinent notes and labs in the chart from the past 30 days and (re)examined the patient.  Any updates or changes from those notes are reflected in this note.

## 2024-09-27 NOTE — PLAN OF CARE
"  Problem: Adult Inpatient Plan of Care  Goal: Plan of Care Review  Description: The Plan of Care Review/Shift note should be completed every shift.  The Outcome Evaluation is a brief statement about your assessment that the patient is improving, declining, or no change.  This information will be displayed automatically on your shift  note.  Outcome: Progressing  Goal: Patient-Specific Goal (Individualized)  Description: You can add care plan individualizations to a care plan. Examples of Individualization might be:  \"Parent requests to be called daily at 9am for status\", \"I have a hard time hearing out of my right ear\", or \"Do not touch me to wake me up as it startles  me\".  Outcome: Progressing  Goal: Absence of Hospital-Acquired Illness or Injury  Outcome: Progressing  Intervention: Identify and Manage Fall Risk  Recent Flowsheet Documentation  Taken 9/26/2024 1600 by Cora Webber RN  Safety Promotion/Fall Prevention: activity supervised  Goal: Optimal Comfort and Wellbeing  Outcome: Progressing  Goal: Readiness for Transition of Care  Outcome: Progressing     Problem: Risk for Delirium  Goal: Optimal Coping  Outcome: Progressing  Intervention: Optimize Psychosocial Adjustment to Delirium  Recent Flowsheet Documentation  Taken 9/26/2024 1600 by Cora Webber RN  Supportive Measures: active listening utilized  Goal: Improved Behavioral Control  Outcome: Progressing  Intervention: Minimize Safety Risk  Recent Flowsheet Documentation  Taken 9/26/2024 1600 by Cora Webber RN  Communication Enhancement Strategies: call light answered in person  Enhanced Safety Measures: monitored by video  Goal: Improved Attention and Thought Clarity  Outcome: Progressing  Intervention: Maximize Cognitive Function  Recent Flowsheet Documentation  Taken 9/26/2024 1600 by Cora Webber RN  Reorientation Measures: clock in view  Goal: Improved Sleep  Outcome: Progressing     Problem: Anemia  Goal: Anemia Symptom Improvement  Outcome: " Progressing  Intervention: Monitor and Manage Anemia  Recent Flowsheet Documentation  Taken 9/26/2024 1600 by Cora Webber RN  Safety Promotion/Fall Prevention: activity supervised     Problem: Gastrointestinal Bleeding  Goal: Optimal Coping with Acute Illness  Outcome: Progressing  Intervention: Optimize Psychosocial Response  Recent Flowsheet Documentation  Taken 9/26/2024 1600 by Cora Webber RN  Supportive Measures: active listening utilized  Goal: Hemostasis  Outcome: Progressing   Goal Outcome Evaluation:      Patient to have colonoscopy tomorrow. Received bowel prep. Patient finished prep. Hgb 11.1 at 1800.Hgb every 6 hours.After Prep stools are liquid dark red/red. Did  bassem Champion if wanted to increase hgb checks. Said if patient stable will continue hgb every 6. Patient is stable. Continue to monitor C/o of back pain-medicated with tylenol.

## 2024-09-27 NOTE — H&P
Pre-procedure Note    Reason for procedure: Hematochezia    History and Physical Reviewed: Reviewed, no changes.    Pre-sedation assessment:    General: alert, appears stated age, and cooperative  Airway: normal  Heart: regular rate and rhythm  Lungs: clear to auscultation bilaterally    Sedation Plan based on assessment: MAC    Mallampati score: Class II (visualization of the soft palate, fauces, and uvula)          ASA Classification: ASA 3 - Patient with moderate systemic disease with functional limitations    Impression: Patient deemed adequate candidate for conscious sedation    Risks, benefits and alternatives were discussed with the patient and informed consent was obtained.    Plan: colonoscopy                                                    Jose Carlos Hood MD  Thank you for the opportunity to participate in the care of this patient.   Please feel free to call me with any questions or concerns.  Phone number [phone].

## 2024-09-27 NOTE — ANESTHESIA POSTPROCEDURE EVALUATION
Patient: Bernard Sanz    Procedure: Procedure(s):  COLONOSCOPY, polypectomy x3       Anesthesia Type:  MAC    Note:  Disposition: Inpatient   Postop Pain Control: Uneventful            Sign Out: Well controlled pain   PONV: No   Neuro/Psych: Uneventful            Sign Out: Acceptable/Baseline neuro status   Airway/Respiratory: Uneventful            Sign Out: Acceptable/Baseline resp. status   CV/Hemodynamics: Uneventful            Sign Out: Acceptable CV status; No obvious hypovolemia; No obvious fluid overload   Other NRE: NONE   DID A NON-ROUTINE EVENT OCCUR? No       Last vitals:  Vitals:    09/26/24 2357 09/27/24 0315 09/27/24 0647   BP: 104/63 131/68 (!) 144/71   Pulse: 68 72 77   Resp: 20 20 18   Temp: 36.6  C (97.8  F) 37  C (98.6  F) 36.9  C (98.5  F)   SpO2: 95% 95% 96%       Electronically Signed By: James Quinteros MD  September 27, 2024  9:29 AM

## 2024-09-27 NOTE — PROGRESS NOTES
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  Underwent colonoscopy on 9/27 with removal of 3 polyps.  GI recommended CT angiogram if rebleeding occurs.  Will advance to full liquid diet--    Acute blood loss anemia due to GI bleed  -- Suspected diverticular bleed  Continue to hold Xarelto--  --continue hemoglobin checks every 12 hours, rather than every 6 hours to prevent iatrogenic blood loss    History of DVT PE  -- Hold Xarelto due to GI bleed    Accelerated hypertension improved    Parkinson's   continue Sinemet          Diet: Full Liquid Diet    DVT Prophylaxis: VTE Prophylaxis contraindicated due to GI bleed  Novak Catheter: Not present  Lines: None     Cardiac Monitoring: None  Code Status: Full Code      Clinically Significant Risk Factors               # Coagulation Defect: INR = 1.93 (Ref range: 0.85 - 1.15) and/or PTT = 35 Seconds (Ref range: 22 - 38 Seconds), will monitor for bleeding    # Hypertension: Noted on problem list           # Overweight: Estimated body mass index is 25.12 kg/m  as calculated from the following:    Height as of this encounter: 1.829 m (6').    Weight as of this encounter: 84 kg (185 lb 3 oz)., PRESENT ON ADMISSION     # Financial/Environmental Concerns:           Disposition Plan     Medically Ready for Discharge: Anticipated Tomorrow             Sree Carmona MD  Hospitalist Service  [facility]  Securely message with [employer] (more info)  Text page via [employer] Paging/Directory   ______________________________________________________________________    Interval History   Patient new to me.  Chart reviewed seen after colonoscopy.  No bleeding yet.  Discussed with wife.  Will advance to full  liquid diet.  Continue to monitor hemoglobin but will decrease the frequency to every 12 hours.    Physical Exam   Vital Signs: Temp: 97.5  F (36.4  C) Temp src: Oral BP: 136/63 Pulse: 72   Resp: 18 SpO2: 94 % O2 Device: None (Room air)    Weight: 185 lbs 2.98 oz     no abdominal tenderness  No distress      Medical Decision Making       25 MINUTES SPENT BY ME on the date of service doing chart review, history, exam, documentation & further activities per the note.  MANAGEMENT DISCUSSED with the following over the past 24 hours: Patient's wife   NOTE(S)/MEDICAL RECORDS REVIEWED over the past 24 hours: GI notes       Data     I have personally reviewed the following data over the past 24 hrs:    N/A  \   9.0 (L)   / N/A     N/A N/A N/A /  N/A   N/A N/A N/A \

## 2024-09-27 NOTE — PLAN OF CARE
Goal Outcome Evaluation:      Problem: Adult Inpatient Plan of Care  Goal: Optimal Comfort and Wellbeing  Outcome: Progressing     Problem: Anemia  Goal: Anemia Symptom Improvement  Intervention: Monitor and Manage Anemia  Recent Flowsheet Documentation  Taken 9/27/2024 1000 by Taylor Rojas RN  Safety Promotion/Fall Prevention:   activity supervised   assistive device/personal items within reach   clutter free environment maintained   mobility aid in reach   nonskid shoes/slippers when out of bed   patient and family education   safety round/check completed     Problem: Gastrointestinal Bleeding  Goal: Hemostasis  Outcome: Progressing  Intervention: Manage Gastrointestinal Bleeding  Recent Flowsheet Documentation  Taken 9/27/2024 1000 by Taylor Rojas RN  Environmental Support: calm environment promoted     Patient is pleasant, cooperative, alert and oriented x 4.  Slurred speech at baseline d/y hx of stroke.  Up with standby assist.  Colonoscopy completed this morning.  IVF per orders.  No stools since procedure.  Q 6 hour hgb checks. Denies pain.  Clear liquid diet per orders.  Continue plan of care.

## 2024-09-27 NOTE — PLAN OF CARE
Problem: Adult Inpatient Plan of Care  Goal: Optimal Comfort and Wellbeing  Outcome: Progressing     Problem: Anemia  Goal: Anemia Symptom Improvement  Outcome: Progressing  Intervention: Monitor and Manage Anemia  Recent Flowsheet Documentation  Taken 9/27/2024 0030 by Beth Hopson RN  Safety Promotion/Fall Prevention: activity supervised   Goal Outcome Evaluation:    Pt restful over noc. Bowel prep complete in anticipation of colonoscopy. Stool is red liquid. Vitals stable. Pt asymptomatic. Follow hgb. IVF infusing per md order. Falls precautions in place.

## 2024-09-28 ENCOUNTER — APPOINTMENT (OUTPATIENT)
Dept: CT IMAGING | Facility: HOSPITAL | Age: 84
DRG: 378 | End: 2024-09-28
Attending: INTERNAL MEDICINE
Payer: COMMERCIAL

## 2024-09-28 LAB
HGB BLD-MCNC: 9 G/DL (ref 13.3–17.7)
HGB BLD-MCNC: 9.4 G/DL (ref 13.3–17.7)
HOLD SPECIMEN: NORMAL

## 2024-09-28 PROCEDURE — 250N000013 HC RX MED GY IP 250 OP 250 PS 637: Performed by: STUDENT IN AN ORGANIZED HEALTH CARE EDUCATION/TRAINING PROGRAM

## 2024-09-28 PROCEDURE — 99232 SBSQ HOSP IP/OBS MODERATE 35: CPT | Performed by: INTERNAL MEDICINE

## 2024-09-28 PROCEDURE — 250N000011 HC RX IP 250 OP 636: Performed by: INTERNAL MEDICINE

## 2024-09-28 PROCEDURE — 120N000001 HC R&B MED SURG/OB

## 2024-09-28 PROCEDURE — 250N000013 HC RX MED GY IP 250 OP 250 PS 637: Performed by: INTERNAL MEDICINE

## 2024-09-28 PROCEDURE — 74174 CTA ABD&PLVS W/CONTRAST: CPT

## 2024-09-28 PROCEDURE — 36415 COLL VENOUS BLD VENIPUNCTURE: CPT | Performed by: INTERNAL MEDICINE

## 2024-09-28 PROCEDURE — 85018 HEMOGLOBIN: CPT | Performed by: INTERNAL MEDICINE

## 2024-09-28 RX ORDER — IOPAMIDOL 755 MG/ML
80 INJECTION, SOLUTION INTRAVASCULAR ONCE
Status: COMPLETED | OUTPATIENT
Start: 2024-09-28 | End: 2024-09-28

## 2024-09-28 RX ADMIN — CARBIDOPA AND LEVODOPA 1 TABLET: 50; 200 TABLET, EXTENDED RELEASE ORAL at 05:18

## 2024-09-28 RX ADMIN — IOPAMIDOL 80 ML: 755 INJECTION, SOLUTION INTRAVENOUS at 12:35

## 2024-09-28 RX ADMIN — PANTOPRAZOLE SODIUM 40 MG: 40 TABLET, DELAYED RELEASE ORAL at 09:25

## 2024-09-28 RX ADMIN — SIMVASTATIN 40 MG: 10 TABLET, FILM COATED ORAL at 20:20

## 2024-09-28 RX ADMIN — CARBIDOPA AND LEVODOPA 1 TABLET: 25; 100 TABLET ORAL at 11:07

## 2024-09-28 RX ADMIN — LISINOPRIL 20 MG: 20 TABLET ORAL at 09:25

## 2024-09-28 RX ADMIN — CARBIDOPA AND LEVODOPA 1 TABLET: 25; 100 TABLET ORAL at 05:17

## 2024-09-28 RX ADMIN — CARBIDOPA AND LEVODOPA 1 TABLET: 25; 100 TABLET ORAL at 17:13

## 2024-09-28 RX ADMIN — CARBIDOPA AND LEVODOPA 1 TABLET: 25; 100 TABLET ORAL at 14:47

## 2024-09-28 RX ADMIN — PANTOPRAZOLE SODIUM 40 MG: 40 TABLET, DELAYED RELEASE ORAL at 20:20

## 2024-09-28 RX ADMIN — CARBIDOPA AND LEVODOPA 1 TABLET: 50; 200 TABLET, EXTENDED RELEASE ORAL at 17:13

## 2024-09-28 RX ADMIN — CARBIDOPA AND LEVODOPA 1 TABLET: 50; 200 TABLET, EXTENDED RELEASE ORAL at 14:47

## 2024-09-28 RX ADMIN — CARBIDOPA AND LEVODOPA 1 TABLET: 50; 200 TABLET, EXTENDED RELEASE ORAL at 11:08

## 2024-09-28 ASSESSMENT — ACTIVITIES OF DAILY LIVING (ADL)
ADLS_ACUITY_SCORE: 30
ADLS_ACUITY_SCORE: 30
ADLS_ACUITY_SCORE: 26
ADLS_ACUITY_SCORE: 30
ADLS_ACUITY_SCORE: 30
ADLS_ACUITY_SCORE: 32
ADLS_ACUITY_SCORE: 32
ADLS_ACUITY_SCORE: 26
ADLS_ACUITY_SCORE: 32
ADLS_ACUITY_SCORE: 30
ADLS_ACUITY_SCORE: 32
ADLS_ACUITY_SCORE: 26
ADLS_ACUITY_SCORE: 30
ADLS_ACUITY_SCORE: 30
ADLS_ACUITY_SCORE: 32
ADLS_ACUITY_SCORE: 26
ADLS_ACUITY_SCORE: 26
ADLS_ACUITY_SCORE: 30
ADLS_ACUITY_SCORE: 26
ADLS_ACUITY_SCORE: 30
ADLS_ACUITY_SCORE: 30
ADLS_ACUITY_SCORE: 26

## 2024-09-28 NOTE — PROGRESS NOTES
GASTROENTEROLOGY PROGRESS NOTE     SUBJECTIVE:  Had 3 bloody stools overnight per patient. Had some blood in toilet around 10:30 this morning and then shortly after passed dark blood. Had CTA, results pending.      OBJECTIVE:  /71 (BP Location: Left arm)   Pulse 64   Temp 98.5  F (36.9  C) (Oral)   Resp 18   Ht 1.829 m (6')   Wt 84 kg (185 lb 3 oz)   SpO2 96%   BMI 25.12 kg/m    Temp (24hrs), Av.2  F (36.8  C), Min:97.5  F (36.4  C), Max:98.7  F (37.1  C)    Patient Vitals for the past 72 hrs:   Weight   24 1706 84 kg (185 lb 3 oz)       Intake/Output Summary (Last 24 hours) at 2024 1402  Last data filed at 2024 1656  Gross per 24 hour   Intake 240 ml   Output --   Net 240 ml        PHYSICAL EXAM  Gen: alert, oriented, NAD  Abd: soft, NT/ND, +BS     Additional Comments:  ROS, FH, SH: See initial GI consult for details.     I have reviewed the patient's new clinical lab results:     Recent Labs   Lab Test 24  0538 24  1727 24  0556 24  1135 24  0539 24  1822 24  0938 24  1626 20  0358 12/15/20  1207   WBC  --   --   --   --  7.3  --  7.9 8.2   < > 8.5   HGB 9.4* 9.0* 9.9*   < > 11.1*  11.1*   < > 13.5 14.2   < > 14.1   MCV  --   --   --   --  100  --  101* 101*   < > 100   PLT  --   --   --   --  195  --  239 245   < > 245   INR  --   --   --   --   --   --  1.93*  --   --  1.18*    < > = values in this interval not displayed.     Recent Labs   Lab Test 24  0539 24  0938 24  0615   POTASSIUM 4.3 4.7 4.1   CHLORIDE 110* 106 107   CO2 24 26 27   BUN 16.5 21.1 12.9   ANIONGAP 7 8 8     Recent Labs   Lab Test 24  0938 20  0358 18  2300 18  2250   ALBUMIN 4.0 2.9*  --  4.0   BILITOTAL 0.9 0.6  --  1.1*   ALT <5 <9  --  12   AST 19 12  --  17   PROTEIN  --   --  50 mg/dL*  --    LIPASE  --   --   --  24     Assessment:  84 year old male with PMH of PE/DVT on Xarelto, Parkinson's disease,  hypertension, GERD, Musa's esophagus s/p HALO treatment, hyperlipidemia who was admitted on 9/25/2024 for bloody stools. CTA negative for acute bleeding at time of admission.     1. Rectal bleeding. Suspected to be diverticular but source not found. S/p colonoscopy 9/27, 2 polyps removed with cold snare, diverticulosis sigmoid/descending colon, external hemorrhoids, normal TI. No active bleeding found. Had recurrent bleeding today. Repeat CTA results pending.     HGB 13.5 on admission. Declined to 9.1 on 9/26. Improved to 9.9 without transfusion. Relatively stable since that time. Hemodynamically stable.     Plan:  -Await CTA results.   -Monitor for overt bleeding.   -Monitor HGB and transfuse prn.   -NPO until CT results back. If negative, could resume full liquid diet.  -Hold Xarelto.     Discussed with Dr. Hood.    Time spent: 25 minutes, greater than 50% of the visit was spent in counseling/coordination of care.     Johana Cruz, PAC  [facility] ([facility])

## 2024-09-28 NOTE — PROGRESS NOTES
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  Underwent colonoscopy on 9/27 with removal of 3 polyps.  Patient continues to have bleeding     On 9/28 and therefore CT abdomen ordered awaiting results.  Acute blood loss anemia due to GI bleed  -- Suspected diverticular bleed but no source identified so far.  Polyps removed on 9/27.  Continue to hold Xarelto--  --hemoglobin has been stable  -- CTA abdomen ordered on 9/28    History of DVT PE  -- Hold Xarelto due to GI bleed    Accelerated hypertension improved    Parkinson's   continue Sinemet          Diet: Full Liquid Diet    DVT Prophylaxis: VTE Prophylaxis contraindicated due to GI bleed  Novak Catheter: Not present  Lines: None     Cardiac Monitoring: None  Code Status: Full Code      Clinically Significant Risk Factors                  # Hypertension: Noted on problem list           # Overweight: Estimated body mass index is 25.12 kg/m  as calculated from the following:    Height as of this encounter: 1.829 m (6').    Weight as of this encounter: 84 kg (185 lb 3 oz)., PRESENT ON ADMISSION       # Financial/Environmental Concerns:           Disposition Plan     Medically Ready for Discharge: Anticipated Tomorrow             Sree Carmona MD  Hospitalist Service  [facility]  Securely message with [employer] (more info)  Text page via [employer] Paging/Directory   ______________________________________________________________________    Interval History   Patient had 3 bloody bowel movements yesterday and 1 bowel movement today.  Ordered CTA abdomen.  Awaiting results.  Wife at bedside.  Hemoglobin is stable at 9.4    Physical Exam   Vital Signs: Temp: 98.5  F (36.9  C) Temp src: Oral BP:  134/71 Pulse: 64   Resp: 18 SpO2: 96 % O2 Device: None (Room air)    Weight: 185 lbs 2.98 oz     no abdominal tenderness  No distress      Medical Decision Making       25 MINUTES SPENT BY ME on the date of service doing chart review, history, exam, documentation & further activities per the note.  MANAGEMENT DISCUSSED with the following over the past 24 hours: Patient's wife   NOTE(S)/MEDICAL RECORDS REVIEWED over the past 24 hours: GI notes       Data     I have personally reviewed the following data over the past 24 hrs:    N/A  \   9.4 (L)   / N/A     N/A N/A N/A /  N/A   N/A N/A N/A \

## 2024-09-28 NOTE — PLAN OF CARE
Problem: Adult Inpatient Plan of Care  Goal: Plan of Care Review  Description: The Plan of Care Review/Shift note should be completed every shift.  The Outcome Evaluation is a brief statement about your assessment that the patient is improving, declining, or no change.  This information will be displayed automatically on your shift  note.  Outcome: Progressing   Goal Outcome Evaluation:       Educated pt & pts spouse whom was present at bedside on treatment plan, both parties voiced understanding.      Problem: Pain Acute  Goal: Optimal Pain Control and Function  Outcome: Progressing     Denies pain.    Writer paged GI in regards to pt having bleeding on quynh/noc shift. At that point pt had had no bleeding since then. Spoke to Dr. Hood whom informed writer to call if re-bleed. Writer wrote patient care order. Pt up to restroom @ 1025, no BM but noted blood from rectal area when voiding. Writer noted blood in toilet. Writer re-paged GI for update. Hospitalist present at bedside for rounding @ 1045 & ordered CTA d/t bleeding. Pt had another void with blood from rectal area (no stool pt states he feels pressure when it gushes out) & writer noted dark red blood in toilet hat. No call back from GI however pts spouse stated staff from their service did round & explained CTA. Pt currently going down for CTA now @ 1211. Denies pain, lightheadedness etc.

## 2024-09-28 NOTE — PLAN OF CARE
Problem: Adult Inpatient Plan of Care  Goal: Plan of Care Review  Description: The Plan of Care Review/Shift note should be completed every shift.  The Outcome Evaluation is a brief statement about your assessment that the patient is improving, declining, or no change.  This information will be displayed automatically on your shift  note.  Outcome: Progressing  Goal: Absence of Hospital-Acquired Illness or Injury  Intervention: Identify and Manage Fall Risk  Recent Flowsheet Documentation  Taken 9/28/2024 0100 by Luciano Fajardo RN  Safety Promotion/Fall Prevention:   activity supervised   assistive device/personal items within reach   clutter free environment maintained   mobility aid in reach   patient and family education   safety round/check completed  Intervention: Prevent Skin Injury  Recent Flowsheet Documentation  Taken 9/28/2024 0222 by Luciano Fajardo RN  Body Position: position changed independently  Taken 9/28/2024 0100 by Luciano Fajardo RN  Body Position: position changed independently  Intervention: Prevent and Manage VTE (Venous Thromboembolism) Risk  Recent Flowsheet Documentation  Taken 9/28/2024 0100 by Luciano Fajardo RN  VTE Prevention/Management: SCDs off (sequential compression devices)     Problem: Anemia  Goal: Anemia Symptom Improvement  Outcome: Progressing  Intervention: Monitor and Manage Anemia  Recent Flowsheet Documentation  Taken 9/28/2024 0100 by Luciano Fajardo RN  Safety Promotion/Fall Prevention:   activity supervised   assistive device/personal items within reach   clutter free environment maintained   mobility aid in reach   patient and family education   safety round/check completed     Problem: Gastrointestinal Bleeding  Goal: Optimal Coping with Acute Illness  Outcome: Progressing  Goal: Hemostasis  Intervention: Manage Gastrointestinal Bleeding  Recent Flowsheet Documentation  Taken 9/28/2024 0100 by Luciano Fajardo  RN  Environmental Support: calm environment promoted     Problem: Comorbidity Management  Goal: Blood Pressure in Desired Range  Outcome: Progressing  Intervention: Maintain Blood Pressure Management  Recent Flowsheet Documentation  Taken 9/28/2024 0100 by Luciano Fajardo RN  Medication Review/Management: medications reviewed     Problem: Pain Acute  Goal: Optimal Pain Control and Function  Outcome: Progressing  Intervention: Prevent or Manage Pain  Recent Flowsheet Documentation  Taken 9/28/2024 0100 by Luciano Fajardo RN  Medication Review/Management: medications reviewed   Goal Outcome Evaluation:       Pt alert & oriented. Stool red liquid x 1 this shift. Denies any pain. On room air, Vital Signs stable. Will continue to monitor.

## 2024-09-28 NOTE — PLAN OF CARE
Patient is A&Ox4. He is cooperative with cares. Denies pain.  Patient only ate clear liquids for dinner. He had 2 large bloody stools at end of shift. Hgb was 9.0 at 6 PM.  Problem: Adult Inpatient Plan of Care  Goal: Optimal Comfort and Wellbeing  Outcome: Progressing     Problem: Risk for Delirium  Goal: Optimal Coping  Outcome: Progressing  Intervention: Optimize Psychosocial Adjustment to Delirium  Recent Flowsheet Documentation  Taken 9/27/2024 1700 by Alicia Angel RN  Supportive Measures:   active listening utilized   positive reinforcement provided  Goal: Improved Behavioral Control  Outcome: Progressing  Intervention: Prevent and Manage Agitation  Recent Flowsheet Documentation  Taken 9/27/2024 1700 by Alicia Angel RN  Environment Familiarity/Consistency: daily routine followed  Intervention: Minimize Safety Risk  Recent Flowsheet Documentation  Taken 9/27/2024 1700 by Alicia Angel RN  Communication Enhancement Strategies:   call light answered in person   communication board used   extra time allowed for response   family involved in communication plan   repetition utilized  Enhanced Safety Measures:   assistive devices when indicated   pain management     Problem: Gastrointestinal Bleeding  Goal: Optimal Coping with Acute Illness  Outcome: Progressing  Intervention: Optimize Psychosocial Response  Recent Flowsheet Documentation  Taken 9/27/2024 1700 by Alicia Angel RN  Supportive Measures:   active listening utilized   positive reinforcement provided     Problem: Adult Inpatient Plan of Care  Goal: Absence of Hospital-Acquired Illness or Injury  Intervention: Identify and Manage Fall Risk  Recent Flowsheet Documentation  Taken 9/27/2024 1700 by Alicia Angel RN  Safety Promotion/Fall Prevention:   activity supervised   assistive device/personal items within reach   clutter free environment maintained   mobility aid in reach   nonskid shoes/slippers when out of bed   patient and family  education   safety round/check completed  Intervention: Prevent Skin Injury  Recent Flowsheet Documentation  Taken 9/27/2024 1700 by Alicia Angel RN  Body Position: position changed independently  Intervention: Prevent and Manage VTE (Venous Thromboembolism) Risk  Recent Flowsheet Documentation  Taken 9/27/2024 1700 by Alicia Angel RN  VTE Prevention/Management: SCDs off (sequential compression devices)  Intervention: Prevent Infection  Recent Flowsheet Documentation  Taken 9/27/2024 1700 by Alicia Angel RN  Infection Prevention: hand hygiene promoted     Problem: Risk for Delirium  Goal: Improved Attention and Thought Clarity  Intervention: Maximize Cognitive Function  Recent Flowsheet Documentation  Taken 9/27/2024 1700 by Alicia Angel RN  Reorientation Measures: clock in view     Problem: Anemia  Goal: Anemia Symptom Improvement  Intervention: Monitor and Manage Anemia  Recent Flowsheet Documentation  Taken 9/27/2024 1700 by Alicia Angel RN  Safety Promotion/Fall Prevention:   activity supervised   assistive device/personal items within reach   clutter free environment maintained   mobility aid in reach   nonskid shoes/slippers when out of bed   patient and family education   safety round/check completed     Problem: Gastrointestinal Bleeding  Goal: Hemostasis  Intervention: Manage Gastrointestinal Bleeding  Recent Flowsheet Documentation  Taken 9/27/2024 1700 by Alicia Angel RN  Environmental Support: calm environment promoted   Goal Outcome Evaluation:

## 2024-09-29 LAB
HGB BLD-MCNC: 9.1 G/DL (ref 13.3–17.7)
HGB BLD-MCNC: 9.3 G/DL (ref 13.3–17.7)
HOLD SPECIMEN: NORMAL

## 2024-09-29 PROCEDURE — 99232 SBSQ HOSP IP/OBS MODERATE 35: CPT | Performed by: INTERNAL MEDICINE

## 2024-09-29 PROCEDURE — 120N000001 HC R&B MED SURG/OB

## 2024-09-29 PROCEDURE — 250N000013 HC RX MED GY IP 250 OP 250 PS 637: Performed by: INTERNAL MEDICINE

## 2024-09-29 PROCEDURE — 85018 HEMOGLOBIN: CPT | Performed by: INTERNAL MEDICINE

## 2024-09-29 PROCEDURE — 36415 COLL VENOUS BLD VENIPUNCTURE: CPT | Performed by: INTERNAL MEDICINE

## 2024-09-29 PROCEDURE — 250N000013 HC RX MED GY IP 250 OP 250 PS 637: Performed by: STUDENT IN AN ORGANIZED HEALTH CARE EDUCATION/TRAINING PROGRAM

## 2024-09-29 RX ADMIN — LISINOPRIL 20 MG: 20 TABLET ORAL at 09:12

## 2024-09-29 RX ADMIN — CARBIDOPA AND LEVODOPA 1 TABLET: 25; 100 TABLET ORAL at 17:07

## 2024-09-29 RX ADMIN — CARBIDOPA AND LEVODOPA 1 TABLET: 50; 200 TABLET, EXTENDED RELEASE ORAL at 14:41

## 2024-09-29 RX ADMIN — CARBIDOPA AND LEVODOPA 1 TABLET: 50; 200 TABLET, EXTENDED RELEASE ORAL at 09:13

## 2024-09-29 RX ADMIN — PANTOPRAZOLE SODIUM 40 MG: 40 TABLET, DELAYED RELEASE ORAL at 20:33

## 2024-09-29 RX ADMIN — CARBIDOPA AND LEVODOPA 1 TABLET: 25; 100 TABLET ORAL at 09:13

## 2024-09-29 RX ADMIN — CARBIDOPA AND LEVODOPA 1 TABLET: 25; 100 TABLET ORAL at 04:55

## 2024-09-29 RX ADMIN — CARBIDOPA AND LEVODOPA 1 TABLET: 50; 200 TABLET, EXTENDED RELEASE ORAL at 17:07

## 2024-09-29 RX ADMIN — CARBIDOPA AND LEVODOPA 1 TABLET: 50; 200 TABLET, EXTENDED RELEASE ORAL at 04:55

## 2024-09-29 RX ADMIN — CARBIDOPA AND LEVODOPA 1 TABLET: 25; 100 TABLET ORAL at 14:41

## 2024-09-29 RX ADMIN — PANTOPRAZOLE SODIUM 40 MG: 40 TABLET, DELAYED RELEASE ORAL at 09:13

## 2024-09-29 RX ADMIN — SIMVASTATIN 40 MG: 10 TABLET, FILM COATED ORAL at 20:32

## 2024-09-29 ASSESSMENT — ACTIVITIES OF DAILY LIVING (ADL)
ADLS_ACUITY_SCORE: 26

## 2024-09-29 NOTE — PROGRESS NOTES
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  Underwent colonoscopy on 9/27 with removal of 3 polyps.  Patient continues to have diverticular bleed but cannot be detected on CTA x 2  Continue to hold Xarelto--  Advance diet to low fiber.  Likely discharge next a.m. if hemoglobin is stable    History of DVT PE 3 years ago in 2020  -- Hold Xarelto due to GI bleed.  Family understands small risk of recurrent PE while off anticoagulation.    Accelerated hypertension improved    Parkinson's   continue Sinemet          Diet: Low Fiber Diet    DVT Prophylaxis: VTE Prophylaxis contraindicated due to GI bleed  Novak Catheter: Not present  Lines: None     Cardiac Monitoring: None  Code Status: Full Code      Clinically Significant Risk Factors                  # Hypertension: Noted on problem list           # Overweight: Estimated body mass index is 25.12 kg/m  as calculated from the following:    Height as of this encounter: 1.829 m (6').    Weight as of this encounter: 84 kg (185 lb 3 oz)., PRESENT ON ADMISSION       # Financial/Environmental Concerns:           Disposition Plan     Medically Ready for Discharge: Anticipated Tomorrow             Sree Carmona MD  Hospitalist Service  [facility]  Securely message with [employer] (more info)  Text page via [employer] Paging/Directory   ______________________________________________________________________    Interval History   Had 1 bloody bowel movement yesterday but none today.  Discussed with family about advancing diet and possible discharge next a.m. if hemoglobin is stable.  Answered many questions.  Also discussed with Dr. Mayfield and Johana, GI nurse practitioner.    Physical Exam   Vital  Signs: Temp: 98.2  F (36.8  C) Temp src: Oral BP: 120/70 Pulse: 66   Resp: 19 SpO2: 97 % O2 Device: None (Room air)    Weight: 185 lbs 2.98 oz     no abdominal tenderness  No distress      Medical Decision Making       25 MINUTES SPENT BY ME on the date of service doing chart review, history, exam, documentation & further activities per the note.  MANAGEMENT DISCUSSED with the following over the past 24 hours: Patient's wife   NOTE(S)/MEDICAL RECORDS REVIEWED over the past 24 hours: GI notes       Data     I have personally reviewed the following data over the past 24 hrs:    N/A  \   9.1 (L)   / N/A     N/A N/A N/A /  N/A   N/A N/A N/A \

## 2024-09-29 NOTE — PLAN OF CARE
Patient is A&Ox4. He is cooperative with cares. Patient ate 1 container ice cream and 1 container yogurt.  He had a large bloody stool 2 hours after dinner. Patient up to commode with standby assist. Denies pain.  Problem: Adult Inpatient Plan of Care  Goal: Optimal Comfort and Wellbeing  Outcome: Progressing     Problem: Risk for Delirium  Goal: Optimal Coping  Outcome: Progressing  Intervention: Optimize Psychosocial Adjustment to Delirium  Recent Flowsheet Documentation  Taken 9/28/2024 2000 by Alicia Angel RN  Supportive Measures:   active listening utilized   positive reinforcement provided  Goal: Improved Behavioral Control  Outcome: Progressing  Intervention: Prevent and Manage Agitation  Recent Flowsheet Documentation  Taken 9/28/2024 2000 by Alicia nAgel RN  Environment Familiarity/Consistency: daily routine followed  Intervention: Minimize Safety Risk  Recent Flowsheet Documentation  Taken 9/28/2024 2000 by Alicia Angel RN  Communication Enhancement Strategies:   call light answered in person   communication board used   extra time allowed for response   family involved in communication plan   repetition utilized  Enhanced Safety Measures:   assistive devices when indicated   pain management     Problem: Gastrointestinal Bleeding  Goal: Optimal Coping with Acute Illness  Outcome: Progressing  Intervention: Optimize Psychosocial Response  Recent Flowsheet Documentation  Taken 9/28/2024 2000 by Alicia Angel RN  Supportive Measures:   active listening utilized   positive reinforcement provided     Problem: Comorbidity Management  Goal: Blood Pressure in Desired Range  Outcome: Progressing  Intervention: Maintain Blood Pressure Management  Recent Flowsheet Documentation  Taken 9/28/2024 2000 by Alicia Angel RN  Medication Review/Management: medications reviewed     Problem: Pain Acute  Goal: Optimal Pain Control and Function  Outcome: Progressing  Intervention: Prevent or Manage Pain  Recent  Flowsheet Documentation  Taken 9/28/2024 2000 by Alicia Angel RN  Sensory Stimulation Regulation:   care clustered   quiet environment promoted  Medication Review/Management: medications reviewed  Intervention: Optimize Psychosocial Wellbeing  Recent Flowsheet Documentation  Taken 9/28/2024 2000 by Alicia Angel RN  Supportive Measures:   active listening utilized   positive reinforcement provided     Problem: Adult Inpatient Plan of Care  Goal: Absence of Hospital-Acquired Illness or Injury  Intervention: Identify and Manage Fall Risk  Recent Flowsheet Documentation  Taken 9/28/2024 2000 by Alicia Angel RN  Safety Promotion/Fall Prevention:   activity supervised   assistive device/personal items within reach   clutter free environment maintained   mobility aid in reach   patient and family education   safety round/check completed  Intervention: Prevent Skin Injury  Recent Flowsheet Documentation  Taken 9/28/2024 1900 by Alicia Angel RN  Body Position: position changed independently  Intervention: Prevent and Manage VTE (Venous Thromboembolism) Risk  Recent Flowsheet Documentation  Taken 9/28/2024 2000 by Alicia Angel RN  VTE Prevention/Management: SCDs off (sequential compression devices)  Intervention: Prevent Infection  Recent Flowsheet Documentation  Taken 9/28/2024 2000 by Alicia Angel RN  Infection Prevention: hand hygiene promoted     Problem: Risk for Delirium  Goal: Improved Attention and Thought Clarity  Intervention: Maximize Cognitive Function  Recent Flowsheet Documentation  Taken 9/28/2024 2000 by Alicia Angel RN  Sensory Stimulation Regulation:   care clustered   quiet environment promoted  Reorientation Measures: clock in view     Problem: Anemia  Goal: Anemia Symptom Improvement  Intervention: Monitor and Manage Anemia  Recent Flowsheet Documentation  Taken 9/28/2024 2000 by Alicia Angel RN  Safety Promotion/Fall Prevention:   activity supervised   assistive device/personal  items within reach   clutter free environment maintained   mobility aid in reach   patient and family education   safety round/check completed     Problem: Gastrointestinal Bleeding  Goal: Hemostasis  Intervention: Manage Gastrointestinal Bleeding  Recent Flowsheet Documentation  Taken 9/28/2024 2000 by Alicia Angel, RN  Environmental Support: calm environment promoted   Goal Outcome Evaluation:

## 2024-09-29 NOTE — PLAN OF CARE
Goal Outcome Evaluation:      Problem: Adult Inpatient Plan of Care  Goal: Optimal Comfort and Wellbeing  Outcome: Progressing     Problem: Anemia  Goal: Anemia Symptom Improvement  Outcome: Progressing  Intervention: Monitor and Manage Anemia  Recent Flowsheet Documentation  Taken 9/29/2024 0910 by Taylor Rojas RN  Safety Promotion/Fall Prevention:   activity supervised   assistive device/personal items within reach   clutter free environment maintained   mobility aid in reach   nonskid shoes/slippers when out of bed   patient and family education   safety round/check completed     Problem: Gastrointestinal Bleeding  Goal: Optimal Coping with Acute Illness  Outcome: Progressing  Intervention: Optimize Psychosocial Response  Recent Flowsheet Documentation  Taken 9/29/2024 0910 by Taylor Rojas RN  Supportive Measures:   active listening utilized   positive reinforcement provided       Patient is pleasant, cooperative, alert and oriented x 4. Slurred speech at baseline d/y hx of stroke. Up with standby assist. No stools this shift. Q 12 hour hgb checks.  Hgb 9.1 this morning.  Denies pain. Full liquid diet per orders. Family requested to speak with GI doctor to get update on plan of care.  [facility] paged and this writer spoke with Dr. Rosario.  Plans to speak to patient and family as soon as able today.  Family updated. Continue plan of care.

## 2024-09-29 NOTE — PLAN OF CARE
Problem: Adult Inpatient Plan of Care  Goal: Plan of Care Review  Description: The Plan of Care Review/Shift note should be completed every shift.  The Outcome Evaluation is a brief statement about your assessment that the patient is improving, declining, or no change.  This information will be displayed automatically on your shift  note.  Outcome: Progressing  Goal: Absence of Hospital-Acquired Illness or Injury  Intervention: Identify and Manage Fall Risk  Recent Flowsheet Documentation  Taken 9/29/2024 0030 by Luciano Fajardo RN  Safety Promotion/Fall Prevention:   activity supervised   assistive device/personal items within reach   clutter free environment maintained   mobility aid in reach   patient and family education   safety round/check completed  Intervention: Prevent Skin Injury  Recent Flowsheet Documentation  Taken 9/29/2024 0549 by Luciano Fajardo RN  Body Position: position changed independently  Intervention: Prevent and Manage VTE (Venous Thromboembolism) Risk  Recent Flowsheet Documentation  Taken 9/29/2024 0030 by Luciano Fajardo RN  VTE Prevention/Management: SCDs off (sequential compression devices)  Intervention: Prevent Infection  Recent Flowsheet Documentation  Taken 9/29/2024 0030 by Luciano Fajardo RN  Infection Prevention: hand hygiene promoted     Problem: Risk for Delirium  Goal: Improved Sleep  Outcome: Progressing     Problem: Anemia  Goal: Anemia Symptom Improvement  Outcome: Progressing  Intervention: Monitor and Manage Anemia  Recent Flowsheet Documentation  Taken 9/29/2024 0030 by Luciano Fajardo RN  Safety Promotion/Fall Prevention:   activity supervised   assistive device/personal items within reach   clutter free environment maintained   mobility aid in reach   patient and family education   safety round/check completed     Problem: Gastrointestinal Bleeding  Goal: Optimal Coping with Acute Illness  Outcome:  Progressing  Intervention: Optimize Psychosocial Response  Recent Flowsheet Documentation  Taken 9/29/2024 0030 by Luciano Fajardo RN  Supportive Measures: positive reinforcement provided  Goal: Hemostasis  Intervention: Manage Gastrointestinal Bleeding  Recent Flowsheet Documentation  Taken 9/29/2024 0030 by Luciano Fajardo RN  Environmental Support: calm environment promoted     Problem: Comorbidity Management  Goal: Blood Pressure in Desired Range  Outcome: Progressing  Intervention: Maintain Blood Pressure Management  Recent Flowsheet Documentation  Taken 9/29/2024 0030 by Luciano Fajardo RN  Medication Review/Management: medications reviewed     Problem: Pain Acute  Goal: Optimal Pain Control and Function  Outcome: Progressing  Intervention: Prevent or Manage Pain  Recent Flowsheet Documentation  Taken 9/29/2024 0030 by Luciano Fajardo RN  Sensory Stimulation Regulation:   care clustered   quiet environment promoted  Medication Review/Management: medications reviewed  Intervention: Optimize Psychosocial Wellbeing  Recent Flowsheet Documentation  Taken 9/29/2024 0030 by Luciano Fajardo RN  Supportive Measures: positive reinforcement provided   Goal Outcome Evaluation:       Pt alert & oriented. No bowel movement this shift. Up to bathroom and bedside commode to void. Denies any pain. On room air, Vital Signs stable. Will continue to monitor.

## 2024-09-29 NOTE — PLAN OF CARE
Patient is A&Ox4. He is cooperative with cares. VSS. Denies pain.  Patient had no bowel movement this shift. Hgb 9.3 at 6 pm.  Problem: Adult Inpatient Plan of Care  Goal: Optimal Comfort and Wellbeing  Outcome: Progressing  Intervention: Monitor Pain and Promote Comfort  Recent Flowsheet Documentation  Taken 9/29/2024 1700 by Alicia Angel RN  Pain Management Interventions: medication (see MAR)     Problem: Risk for Delirium  Goal: Optimal Coping  Outcome: Progressing  Intervention: Optimize Psychosocial Adjustment to Delirium  Recent Flowsheet Documentation  Taken 9/29/2024 1700 by Alicia Angel RN  Supportive Measures:   active listening utilized   positive reinforcement provided  Goal: Improved Behavioral Control  Outcome: Progressing  Intervention: Prevent and Manage Agitation  Recent Flowsheet Documentation  Taken 9/29/2024 1700 by Alicia Angel RN  Environment Familiarity/Consistency: daily routine followed  Intervention: Minimize Safety Risk  Recent Flowsheet Documentation  Taken 9/29/2024 1700 by Alicia Angel RN  Communication Enhancement Strategies:   call light answered in person   communication board used   extra time allowed for response   family involved in communication plan   repetition utilized  Enhanced Safety Measures:   assistive devices when indicated   pain management     Problem: Anemia  Goal: Anemia Symptom Improvement  Outcome: Progressing  Intervention: Monitor and Manage Anemia  Recent Flowsheet Documentation  Taken 9/29/2024 1700 by Alicia Angel RN  Safety Promotion/Fall Prevention:   activity supervised   assistive device/personal items within reach   clutter free environment maintained   mobility aid in reach   nonskid shoes/slippers when out of bed   patient and family education   safety round/check completed     Problem: Gastrointestinal Bleeding  Goal: Optimal Coping with Acute Illness  Outcome: Progressing  Intervention: Optimize Psychosocial Response  Recent Flowsheet  Documentation  Taken 9/29/2024 1700 by Alicia Angel RN  Supportive Measures:   active listening utilized   positive reinforcement provided     Problem: Pain Acute  Goal: Optimal Pain Control and Function  Outcome: Progressing  Intervention: Develop Pain Management Plan  Recent Flowsheet Documentation  Taken 9/29/2024 1700 by Alicia Angel RN  Pain Management Interventions: medication (see MAR)  Intervention: Prevent or Manage Pain  Recent Flowsheet Documentation  Taken 9/29/2024 1700 by Alicia Angel RN  Sensory Stimulation Regulation:   care clustered   quiet environment promoted  Medication Review/Management: medications reviewed  Intervention: Optimize Psychosocial Wellbeing  Recent Flowsheet Documentation  Taken 9/29/2024 1700 by Alicia Angel RN  Supportive Measures:   active listening utilized   positive reinforcement provided     Problem: Adult Inpatient Plan of Care  Goal: Absence of Hospital-Acquired Illness or Injury  Intervention: Identify and Manage Fall Risk  Recent Flowsheet Documentation  Taken 9/29/2024 1700 by Alicia Angel RN  Safety Promotion/Fall Prevention:   activity supervised   assistive device/personal items within reach   clutter free environment maintained   mobility aid in reach   nonskid shoes/slippers when out of bed   patient and family education   safety round/check completed  Intervention: Prevent Skin Injury  Recent Flowsheet Documentation  Taken 9/29/2024 1700 by Alicia Angel RN  Body Position: position changed independently  Skin Protection:   adhesive use limited   incontinence pads utilized  Device Skin Pressure Protection:   absorbent pad utilized/changed   adhesive use limited  Intervention: Prevent and Manage VTE (Venous Thromboembolism) Risk  Recent Flowsheet Documentation  Taken 9/29/2024 1700 by Alicia Angel RN  VTE Prevention/Management: SCDs off (sequential compression devices)  Intervention: Prevent Infection  Recent Flowsheet Documentation  Taken  9/29/2024 1700 by Alicia Angel RN  Infection Prevention: hand hygiene promoted     Problem: Risk for Delirium  Goal: Improved Attention and Thought Clarity  Intervention: Maximize Cognitive Function  Recent Flowsheet Documentation  Taken 9/29/2024 1700 by Alicia Angel RN  Sensory Stimulation Regulation:   care clustered   quiet environment promoted  Reorientation Measures: clock in view     Problem: Gastrointestinal Bleeding  Goal: Hemostasis  Intervention: Manage Gastrointestinal Bleeding  Recent Flowsheet Documentation  Taken 9/29/2024 1700 by Alicia Angel RN  Environmental Support: calm environment promoted     Problem: Comorbidity Management  Goal: Blood Pressure in Desired Range  Intervention: Maintain Blood Pressure Management  Recent Flowsheet Documentation  Taken 9/29/2024 1700 by Alicia Angel RN  Medication Review/Management: medications reviewed   Goal Outcome Evaluation:

## 2024-09-29 NOTE — PROGRESS NOTES
GASTROENTEROLOGY PROGRESS NOTE     SUBJECTIVE:  One bloody stool overnight. No further stools today. Tolerating full liquids. Wife and daughter present. Frustrated with hospital course.      OBJECTIVE:  BP (!) 150/74 (BP Location: Left arm)   Pulse 64   Temp 97.9  F (36.6  C) (Oral)   Resp 20   Ht 1.829 m (6')   Wt 84 kg (185 lb 3 oz)   SpO2 95%   BMI 25.12 kg/m    Temp (24hrs), Av.2  F (36.8  C), Min:97.5  F (36.4  C), Max:98.7  F (37.1  C)    No data found.      Intake/Output Summary (Last 24 hours) at 2024 1402  Last data filed at 2024 1656  Gross per 24 hour   Intake 240 ml   Output --   Net 240 ml        PHYSICAL EXAM  Gen: alert, oriented, NAD  Abd: soft, NT/ND, +BS     Additional Comments:  ROS, FH, SH: See initial GI consult for details.     I have reviewed the patient's new clinical lab results:     Recent Labs   Lab Test 24  0616 24  1808 24  0538 24  1135 24  0539 24  1822 24  0938 24  1626 20  0358 12/15/20  1207   WBC  --   --   --   --  7.3  --  7.9 8.2   < > 8.5   HGB 9.1* 9.0* 9.4*   < > 11.1*  11.1*   < > 13.5 14.2   < > 14.1   MCV  --   --   --   --  100  --  101* 101*   < > 100   PLT  --   --   --   --  195  --  239 245   < > 245   INR  --   --   --   --   --   --  1.93*  --   --  1.18*    < > = values in this interval not displayed.     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 CTA negative for acute bleeding at time of admission.     1. Rectal bleeding. Suspected to be diverticular but source not found. S/p colonoscopy 9/27, 2 polyps removed with cold snare, diverticulosis sigmoid/descending colon, external hemorrhoids, normal TI. No active bleeding found. Had recurrent bleeding 9/28, repeat CTA again showed no active bleeding.    HGB 13.5 on admission. Has been in low 9s/stable here. Hemodynamically stable.     Plan:  -Discussed with Dr. Hood and Dr. Carmona from hospital team. Difficult situation with not being able to localize source of the bleed. At this point, ok to advance diet to low fiber. Dr. Carmona plans on having patient hold Xarelto for 1-2 weeks, possibly longer (DVT/PE 3 yrs ago). Patient will discharge tomorrow if HGB stable and no significant bleeding. Plan for return to hospital ASAP if bleeding recurs. CTA can be done at that point. Recheck HGB with PCP after discharge.     Discussed with Dr. Hood.    Time spent: 25 minutes, greater than 50% of the visit was spent in counseling/coordination of care.     Johana Cruz, PAC  [facility] (Former[facility])

## 2024-09-30 VITALS
HEART RATE: 64 BPM | SYSTOLIC BLOOD PRESSURE: 149 MMHG | HEIGHT: 72 IN | OXYGEN SATURATION: 94 % | WEIGHT: 185.19 LBS | RESPIRATION RATE: 18 BRPM | BODY MASS INDEX: 25.08 KG/M2 | TEMPERATURE: 98 F | DIASTOLIC BLOOD PRESSURE: 78 MMHG

## 2024-09-30 LAB
HGB BLD-MCNC: 9.3 G/DL (ref 13.3–17.7)
PATH REPORT.COMMENTS IMP SPEC: NORMAL
PATH REPORT.COMMENTS IMP SPEC: NORMAL
PATH REPORT.FINAL DX SPEC: NORMAL
PATH REPORT.GROSS SPEC: NORMAL
PATH REPORT.MICROSCOPIC SPEC OTHER STN: NORMAL
PATH REPORT.RELEVANT HX SPEC: NORMAL
PHOTO IMAGE: NORMAL

## 2024-09-30 PROCEDURE — 36415 COLL VENOUS BLD VENIPUNCTURE: CPT | Performed by: INTERNAL MEDICINE

## 2024-09-30 PROCEDURE — 88305 TISSUE EXAM BY PATHOLOGIST: CPT | Mod: 26 | Performed by: PATHOLOGY

## 2024-09-30 PROCEDURE — 85018 HEMOGLOBIN: CPT | Performed by: INTERNAL MEDICINE

## 2024-09-30 PROCEDURE — 99239 HOSP IP/OBS DSCHRG MGMT >30: CPT | Performed by: INTERNAL MEDICINE

## 2024-09-30 PROCEDURE — 250N000013 HC RX MED GY IP 250 OP 250 PS 637: Performed by: STUDENT IN AN ORGANIZED HEALTH CARE EDUCATION/TRAINING PROGRAM

## 2024-09-30 PROCEDURE — 250N000013 HC RX MED GY IP 250 OP 250 PS 637: Performed by: INTERNAL MEDICINE

## 2024-09-30 RX ADMIN — PANTOPRAZOLE SODIUM 40 MG: 40 TABLET, DELAYED RELEASE ORAL at 08:27

## 2024-09-30 RX ADMIN — CARBIDOPA AND LEVODOPA 1 TABLET: 50; 200 TABLET, EXTENDED RELEASE ORAL at 10:29

## 2024-09-30 RX ADMIN — CARBIDOPA AND LEVODOPA 1 TABLET: 25; 100 TABLET ORAL at 04:56

## 2024-09-30 RX ADMIN — CARBIDOPA AND LEVODOPA 1 TABLET: 50; 200 TABLET, EXTENDED RELEASE ORAL at 04:56

## 2024-09-30 RX ADMIN — LISINOPRIL 20 MG: 20 TABLET ORAL at 08:27

## 2024-09-30 RX ADMIN — CARBIDOPA AND LEVODOPA 1 TABLET: 25; 100 TABLET ORAL at 10:29

## 2024-09-30 ASSESSMENT — ACTIVITIES OF DAILY LIVING (ADL)
ADLS_ACUITY_SCORE: 29
ADLS_ACUITY_SCORE: 26
ADLS_ACUITY_SCORE: 26
ADLS_ACUITY_SCORE: 29
ADLS_ACUITY_SCORE: 26
ADLS_ACUITY_SCORE: 26
ADLS_ACUITY_SCORE: 29
ADLS_ACUITY_SCORE: 26

## 2024-09-30 NOTE — PLAN OF CARE
Problem: Adult Inpatient Plan of Care  Goal: Plan of Care Review  Description: The Plan of Care Review/Shift note should be completed every shift.  The Outcome Evaluation is a brief statement about your assessment that the patient is improving, declining, or no change.  This information will be displayed automatically on your shift  note.  Outcome: Progressing  Goal: Absence of Hospital-Acquired Illness or Injury  Intervention: Identify and Manage Fall Risk  Recent Flowsheet Documentation  Taken 9/30/2024 0030 by Luciano Fajardo RN  Safety Promotion/Fall Prevention:   activity supervised   assistive device/personal items within reach   clutter free environment maintained   mobility aid in reach   nonskid shoes/slippers when out of bed   patient and family education   safety round/check completed  Intervention: Prevent Skin Injury  Recent Flowsheet Documentation  Taken 9/30/2024 0030 by Luciano Fajardo RN  Body Position: position changed independently  Intervention: Prevent and Manage VTE (Venous Thromboembolism) Risk  Recent Flowsheet Documentation  Taken 9/30/2024 0030 by Luciano Fajardo RN  VTE Prevention/Management: SCDs off (sequential compression devices)     Problem: Anemia  Goal: Anemia Symptom Improvement  Outcome: Progressing  Intervention: Monitor and Manage Anemia  Recent Flowsheet Documentation  Taken 9/30/2024 0030 by Luciano Fajardo RN  Safety Promotion/Fall Prevention:   activity supervised   assistive device/personal items within reach   clutter free environment maintained   mobility aid in reach   nonskid shoes/slippers when out of bed   patient and family education   safety round/check completed     Problem: Comorbidity Management  Goal: Blood Pressure in Desired Range  Outcome: Progressing  Intervention: Maintain Blood Pressure Management  Recent Flowsheet Documentation  Taken 9/30/2024 0030 by Luciano Fajardo RN  Medication Review/Management:  medications reviewed     Problem: Pain Acute  Goal: Optimal Pain Control and Function  Outcome: Progressing  Intervention: Prevent or Manage Pain  Recent Flowsheet Documentation  Taken 9/30/2024 0030 by Luciano Fajardo RN  Sensory Stimulation Regulation: care clustered  Medication Review/Management: medications reviewed  Intervention: Optimize Psychosocial Wellbeing  Recent Flowsheet Documentation  Taken 9/30/2024 0030 by Luciano Fajardo RN  Supportive Measures: positive reinforcement provided   Goal Outcome Evaluation:       Pt alert & oriented. Denies any pain. No bowel movement this shift. On room air, Vital Signs stable. Will continue to monitor.

## 2024-09-30 NOTE — PROGRESS NOTES
Care Management Discharge Note    Discharge Date: 09/30/2024       Discharge Disposition: Home    Discharge Services:      Discharge DME:      Discharge Transportation:      Private pay costs discussed: Not applicable    Does the patient's insurance plan have a 3 day qualifying hospital stay waiver?  Yes     Which insurance plan 3 day waiver is available? Alternative insurance waiver    Will the waiver be used for post-acute placement? No    PAS Confirmation Code:    Patient/family educated on Medicare website which has current facility and service quality ratings:      Education Provided on the Discharge Plan:    Persons Notified of Discharge Plans: patient   Patient/Family in Agreement with the Plan:      Handoff Referral Completed: No, handoff not indicated or clinically appropriate    Additional Information:  Home today, no care management needs identified     No further care management intervention anticipated at this time.  Please re-consult if further needs arise.  Care management signing off.        Carole Villaseñor RN

## 2024-09-30 NOTE — DISCHARGE SUMMARY
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    Weight as of this encounter: 84 kg (185 lb 3 oz).       Follow-ups Needed After Discharge   Follow-up Appointments     Follow-up and recommended labs and tests       Follow up with primary care provider, Tammy Boyle, within 7 days for   hospital follow- up.  The following labs/tests are recommended: Hemoglobin   check in 1 week.            Unresulted Labs Ordered in the Past 30 Days of this Admission       Date and Time Order Name Status Description    9/27/2024  8:22 AM Surgical Pathology Exam In process         These results will be followed up by GI    Discharge Disposition   Discharged to home  Condition at discharge: Stable    Hospital Course   84-year-old with past history of DVT PE on Xarelto, Parkinson, hypertension, GERD, hyperlipidemia who presented with painless lower GI bleed suspected diverticular bleed.  Underwent colonoscopy on 9/27 with removal of 3 polyps.  No sign of arteriovenous malformation/AVM.  Patient continues to have diverticular bleed but cannot be detected on CTA x 2.  He was monitored in the hospital for 2 more days and has had no bowel movement for 24 hours.  Hemoglobin remained stable at 9.3.  Patient is advised to hold Xarelto for at least 1 week preferably 4 weeks to prevent recurrent GI bleed.  In the interim if he develops shortness of breath then he is advised to restart Xarelto and come to the ER for CT chest for  PE.  Recheck    History of DVT PE about 4 years ago in 2020  -- Hold Xarelto due to GI bleed.  Family understands small risk of recurrent PE while off anticoagulation.    Accelerated hypertension improved    Parkinson's   continue Sinemet    Consultations This Hospital Stay   CARE MANAGEMENT / SOCIAL WORK IP CONSULT  GASTROENTEROLOGY IP CONSULT    Code Status   Full Code    Time Spent on this Encounter   ISree MD, personally saw the patient today and spent greater than 30 minutes discharging this patient.       Sree Carmona MD  [address] [address]  Phone: [phone]  Fax: [phone]  ______________________________________________________________________    Physical Exam   Vital Signs: Temp: 98  F (36.7  C) Temp src: Oral BP: (!) 149/78 (RN NOTIFIED) Pulse: 64   Resp: 18 SpO2: 94 % O2 Device: None (Room air)    Weight: 185 lbs 2.98 oz  No apparent distress       Primary Care Physician   Tammy Boyle    Discharge Orders      Reason for your hospital stay    Lower GI bleed     Follow-up and recommended labs and tests     Follow up with primary care provider, Tammy Boyle, within 7 days for hospital follow- up.  The following labs/tests are recommended: Hemoglobin check in 1 week.     Activity    Your activity upon discharge: activity as tolerated     Discharge Instructions    Please return to the ER if passing blood clots from the rectum or having severe shortness of breath at rest.  Hold Xarelto for at least a week preferably 4 weeks due to recent GI bleed.     Diet    Follow this diet upon discharge: Current Diet:Orders Placed This Encounter      Low Fiber Diet       Significant Results and Procedures   Results for orders placed or performed during the hospital encounter of 09/25/24   CTA Abdomen Pelvis with Contrast    Narrative    EXAM: CTA ABDOMEN PELVIS WITH CONTRAST  LOCATION: [facility]  DATE:  9/25/2024    INDICATION: GI bleed on Xarelto  COMPARISON: CT abdomen pelvis without contrast 8/29/2023  TECHNIQUE: CT angiogram abdomen pelvis during arterial phase of injection of IV contrast. 2D and 3D MIP reconstructions were performed by the CT technologist. Dose reduction techniques were used.  CONTRAST: IsoVue 370 90mL    FINDINGS:  ANGIOGRAM ABDOMEN/PELVIS: There is moderate, patchy calcified plaque throughout the descending thoracic aorta, abdominal aorta, and the iliac arteries. No abdominal aortic aneurysm or critical stenosis. The celiac axis, SMA and their named proximal   branches are widely patent. Patent HENRY. Single bilateral renal arteries have mild proximal calcified plaque but no flow limiting narrowing.    No endoluminal blush or contrast accumulation within the bowel on arterial or portal venous phase acquisitions to suggest a point source of acute gastrointestinal hemorrhage.    LOWER CHEST: Subsegmental territories of atelectasis in the bases. Small hiatal hernia. Minimal calcification of the aortic root.    HEPATOBILIARY: Post cholecystectomy. No bile duct enlargement. Liver is normal in size with smooth capsule. No liver lesions.    PANCREAS: Normal.    SPLEEN: Normal.    ADRENAL GLANDS: Normal.    KIDNEYS/BLADDER: No significant mass, stones, or hydronephrosis. There are simple or benign cysts. No follow up is needed.    BOWEL: There are multiple diverticula arising from the descending and sigmoid colon. No diverticular or bowel wall thickening. No dilated bowel or focal bowel caliber transition. No inflammatory stranding in the mesentery. No peritoneal thickening or   ascites.    LYMPH NODES: Normal.    PELVIC ORGANS: Status post prostatectomy. No pelvic mass or free fluid.    MUSCULOSKELETAL: Status post left inguinal hernia repair. Multilevel low thoracic and lumbar disc space narrowing and marginal osteophytes. Vacuum disc phenomenon are present at L1-L2, L4-5 and L5-S1.      Impression     IMPRESSION:    1.  Moderate aortoiliac atheromatous plaque but no aneurysm. No acute abnormalities of the mesenteric arteries.  2.  No findings to suggest point source of acute gastrointestinal hemorrhage.  3.  Descending and sigmoid diverticulosis but no diverticulitis.   CTA Abdomen Pelvis with Contrast    Narrative    EXAM: CTA ABDOMEN PELVIS WITH CONTRAST  LOCATION: [facility]  DATE: 9/28/2024    INDICATION: lower gi bleed. s p colonoscopy  COMPARISON: 9/25/2024  TECHNIQUE: CT angiogram abdomen pelvis during arterial phase of injection of IV contrast. 2D and 3D MIP reconstructions were performed by the CT technologist. Dose reduction techniques were used.  CONTRAST: 80ml [id number]    FINDINGS:  ANGIOGRAM ABDOMEN/PELVIS: No active bleeding into the gastrointestinal system. There is atherosclerotic disease.    LOWER CHEST: Coronary artery disease noted.    HEPATOBILIARY: Cholecystectomy.    PANCREAS: Normal.    SPLEEN: Normal.    ADRENAL GLANDS: Normal.    KIDNEYS/BLADDER: Simple renal cysts do not require follow-up.    BOWEL: There is mild colonic diverticulosis. No diverticulitis.    LYMPH NODES: Normal.    PELVIC ORGANS: Prostatectomy.    MUSCULOSKELETAL: Bony sclerosis and lucency involving the posterior aspect of the left acetabulum, the left ischial tuberosity, and the left inferior ramus is again seen. This has not significantly changed since at least 2013 and is likely Paget disease.   There are degenerative changes in the spine.      Impression    IMPRESSION:  1.  No active bleeding into the gastrointestinal system.  2.  Colonic diverticulosis. No diverticulitis.  3.  Atherosclerotic disease including coronary artery disease.         Discharge Medications   Current Discharge Medication List        CONTINUE these medications which have NOT CHANGED    Details   acetaminophen (TYLENOL) 325 MG tablet Take 650 mg by mouth every 8 hours as needed for mild pain.       acetaminophen-codeine (TYLENOL #2) 300-15 MG per tablet Take 1 tablet by mouth every 6 hours as needed.      carbidopa-levodopa (SINEMET CR)  MG CR tablet 1 tablet 4 times daily 0430, 10AM, 2PM, 6PM  Takes with carbidopa-levodopa 25/100 at same times      carbidopa-levodopa (SINEMET)  MG tablet Take 1 tablet by mouth 4 times daily 0430, 1000, 1400, 1800  Takes at same time as Sinemet CR 50/200      gabapentin (NEURONTIN) 100 MG capsule Take 100 mg by mouth daily as needed.      lisinopril (ZESTRIL) 20 MG tablet Take 20 mg by mouth every morning.      omeprazole (PRILOSEC) 20 MG DR capsule Take 20 mg by mouth 2 times daily.      simvastatin (ZOCOR) 40 MG tablet Take 40 mg by mouth at bedtime           STOP taking these medications       rivaroxaban ANTICOAGULANT (XARELTO) 20 MG TABS tablet Comments:   Reason for Stopping:             Allergies   Allergies   Allergen Reactions    Tetracycline

## 2024-09-30 NOTE — PROGRESS NOTES
Discussed discharge paperwork with pt, pts spouse & pts daughter whom were present at bedside. All parties voiced understanding. Personal belongings sent with pt. Pt discharged via w/c accompanied by aide/spouse to meet daughter at entrance of hospital for transport home.

## 2025-05-25 ENCOUNTER — HEALTH MAINTENANCE LETTER (OUTPATIENT)
Age: 85
End: 2025-05-25

## (undated) DEVICE — SOL WATER IRRIG 1000ML BOTTLE 2F7114

## (undated) DEVICE — ENDO SNARE EXACTO COLD 9MM LOOP 2.4MMX230CM 00711115

## (undated) DEVICE — TUBING SUCTION MEDI-VAC 1/4"X20' N620A

## (undated) DEVICE — SUCTION MANIFOLD NEPTUNE 2 SYS 1 PORT 702-025-000

## (undated) RX ORDER — PROPOFOL 10 MG/ML
INJECTION, EMULSION INTRAVENOUS
Status: DISPENSED
Start: 2024-09-27